# Patient Record
Sex: FEMALE | Race: WHITE | NOT HISPANIC OR LATINO | Employment: OTHER | URBAN - METROPOLITAN AREA
[De-identification: names, ages, dates, MRNs, and addresses within clinical notes are randomized per-mention and may not be internally consistent; named-entity substitution may affect disease eponyms.]

---

## 2018-06-20 ENCOUNTER — TRANSCRIBE ORDERS (OUTPATIENT)
Dept: ADMINISTRATIVE | Facility: HOSPITAL | Age: 73
End: 2018-06-20

## 2018-06-20 DIAGNOSIS — Z12.39 ENCOUNTER FOR BREAST CANCER SCREENING OTHER THAN MAMMOGRAM: ICD-10-CM

## 2018-06-20 DIAGNOSIS — Z12.39 SCREENING BREAST EXAMINATION: Primary | ICD-10-CM

## 2018-07-26 ENCOUNTER — TELEPHONE (OUTPATIENT)
Dept: FAMILY MEDICINE CLINIC | Facility: CLINIC | Age: 73
End: 2018-07-26

## 2018-07-27 DIAGNOSIS — E05.90 HYPERTHYROIDISM: Primary | ICD-10-CM

## 2018-07-28 PROBLEM — E78.00 HIGH CHOLESTEROL: Status: ACTIVE | Noted: 2018-07-28

## 2018-07-28 PROBLEM — I25.10 CAD (CORONARY ARTERY DISEASE): Status: ACTIVE | Noted: 2018-07-28

## 2018-07-28 PROBLEM — I10 HYPERTENSION: Status: ACTIVE | Noted: 2018-07-28

## 2018-07-28 PROBLEM — J44.9 COPD (CHRONIC OBSTRUCTIVE PULMONARY DISEASE) (HCC): Status: ACTIVE | Noted: 2018-07-28

## 2018-07-28 PROBLEM — M06.9 RHEUMATOID ARTHRITIS (HCC): Status: ACTIVE | Noted: 2018-07-28

## 2018-07-28 RX ORDER — ASPIRIN 81 MG/1
81 TABLET ORAL DAILY
COMMUNITY

## 2018-07-28 RX ORDER — SIMVASTATIN 80 MG
80 TABLET ORAL DAILY
COMMUNITY
End: 2019-02-01 | Stop reason: SDUPTHER

## 2018-07-28 RX ORDER — LISINOPRIL AND HYDROCHLOROTHIAZIDE 20; 12.5 MG/1; MG/1
1 TABLET ORAL DAILY
COMMUNITY
End: 2018-11-29 | Stop reason: SDUPTHER

## 2018-07-28 RX ORDER — METOPROLOL SUCCINATE 50 MG/1
25 TABLET, EXTENDED RELEASE ORAL DAILY
COMMUNITY
End: 2020-01-01 | Stop reason: SDUPTHER

## 2018-07-28 RX ORDER — TRAMADOL HYDROCHLORIDE 50 MG/1
50 TABLET ORAL DAILY
COMMUNITY
End: 2018-10-02 | Stop reason: SDUPTHER

## 2018-07-28 RX ORDER — LEVOTHYROXINE SODIUM 0.05 MG/1
50 TABLET ORAL DAILY
COMMUNITY
End: 2018-12-09 | Stop reason: SDUPTHER

## 2018-07-28 RX ORDER — METHOTREXATE 2.5 MG/1
2.5 TABLET ORAL
COMMUNITY

## 2018-08-01 ENCOUNTER — HOSPITAL ENCOUNTER (EMERGENCY)
Facility: HOSPITAL | Age: 73
Discharge: HOME | End: 2018-08-01
Attending: EMERGENCY MEDICINE | Admitting: EMERGENCY MEDICINE
Payer: OTHER MISCELLANEOUS

## 2018-08-01 ENCOUNTER — APPOINTMENT (EMERGENCY)
Dept: CT IMAGING | Facility: HOSPITAL | Age: 73
End: 2018-08-01
Payer: OTHER MISCELLANEOUS

## 2018-08-01 VITALS
SYSTOLIC BLOOD PRESSURE: 170 MMHG | BODY MASS INDEX: 27.47 KG/M2 | HEART RATE: 74 BPM | TEMPERATURE: 99.6 F | OXYGEN SATURATION: 99 % | RESPIRATION RATE: 17 BRPM | HEIGHT: 67 IN | WEIGHT: 175 LBS | DIASTOLIC BLOOD PRESSURE: 80 MMHG

## 2018-08-01 DIAGNOSIS — S29.011A MUSCLE STRAIN OF CHEST WALL, INITIAL ENCOUNTER: Primary | ICD-10-CM
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
## 2018-08-01 DIAGNOSIS — S20.219A CHEST WALL CONTUSION: ICD-10-CM

## 2018-08-01 DIAGNOSIS — M54.9 BACK PAIN: ICD-10-CM

## 2018-08-01 LAB
ANION GAP SERPL CALCULATED.3IONS-SCNC: 8 MMOL/L (ref 4–13)
BASOPHILS # BLD AUTO: 0 THOUSANDS/ΜL (ref 0–0.1)
BASOPHILS NFR BLD AUTO: 0 % (ref 0–1)
BUN SERPL-MCNC: 14 MG/DL (ref 7–25)
CALCIUM SERPL-MCNC: 10.2 MG/DL (ref 8.6–10.5)
CHLORIDE SERPL-SCNC: 104 MMOL/L (ref 98–107)
CO2 SERPL-SCNC: 26 MMOL/L (ref 21–31)
CREAT SERPL-MCNC: 0.9 MG/DL (ref 0.6–1.2)
EOSINOPHIL # BLD AUTO: 0 THOUSAND/ΜL (ref 0–0.61)
EOSINOPHIL NFR BLD AUTO: 1 % (ref 0–6)
ERYTHROCYTE [DISTWIDTH] IN BLOOD BY AUTOMATED COUNT: 14.7 % (ref 11.6–15.1)
GFR SERPL CREATININE-BSD FRML MDRD: 63 ML/MIN/1.73SQ M
GLUCOSE SERPL-MCNC: 107 MG/DL (ref 65–140)
HCT VFR BLD AUTO: 41.5 % (ref 37–47)
HGB BLD-MCNC: 14 G/DL (ref 11.5–15.4)
LYMPHOCYTES # BLD AUTO: 0.8 THOUSANDS/ΜL (ref 0.6–4.47)
LYMPHOCYTES NFR BLD AUTO: 11 % (ref 14–44)
MCH RBC QN AUTO: 33.6 PG (ref 26.8–34.3)
MCHC RBC AUTO-ENTMCNC: 33.6 G/DL (ref 31.4–37.4)
MCV RBC AUTO: 100 FL (ref 82–98)
MONOCYTES # BLD AUTO: 0.8 THOUSAND/ΜL (ref 0.17–1.22)
MONOCYTES NFR BLD AUTO: 10 % (ref 4–12)
NEUTROPHILS # BLD AUTO: 5.9 THOUSANDS/ΜL (ref 1.85–7.62)
NEUTS SEG NFR BLD AUTO: 78 % (ref 43–75)
NRBC BLD AUTO-RTO: 0 /100 WBCS
PLATELET # BLD AUTO: 316 THOUSANDS/UL (ref 149–390)
PMV BLD AUTO: 8.6 FL (ref 8.9–12.7)
POTASSIUM SERPL-SCNC: 3.8 MMOL/L (ref 3.5–5.5)
RBC # BLD AUTO: 4.16 MILLION/UL (ref 3.81–5.12)
SODIUM SERPL-SCNC: 138 MMOL/L (ref 134–143)
TROPONIN I SERPL-MCNC: <0.03 NG/ML
WBC # BLD AUTO: 7.6 THOUSAND/UL (ref 4.31–10.16)

## 2018-08-01 PROCEDURE — 80048 BASIC METABOLIC PNL TOTAL CA: CPT | Performed by: EMERGENCY MEDICINE

## 2018-08-01 PROCEDURE — 36415 COLL VENOUS BLD VENIPUNCTURE: CPT | Performed by: EMERGENCY MEDICINE

## 2018-08-01 PROCEDURE — 99284 EMERGENCY DEPT VISIT MOD MDM: CPT

## 2018-08-01 PROCEDURE — 85025 COMPLETE CBC W/AUTO DIFF WBC: CPT | Performed by: EMERGENCY MEDICINE

## 2018-08-01 PROCEDURE — 74176 CT ABD & PELVIS W/O CONTRAST: CPT

## 2018-08-01 PROCEDURE — 84484 ASSAY OF TROPONIN QUANT: CPT | Performed by: EMERGENCY MEDICINE

## 2018-08-01 PROCEDURE — 71250 CT THORAX DX C-: CPT

## 2018-08-01 PROCEDURE — 93005 ELECTROCARDIOGRAM TRACING: CPT

## 2018-08-01 RX ORDER — 0.9 % SODIUM CHLORIDE 0.9 %
3 VIAL (ML) INJECTION AS NEEDED
Status: DISCONTINUED | OUTPATIENT
Start: 2018-08-01 | End: 2018-08-01 | Stop reason: HOSPADM

## 2018-08-01 RX ORDER — DIAZEPAM 5 MG/1
5 TABLET ORAL ONCE
Status: COMPLETED | OUTPATIENT
Start: 2018-08-01 | End: 2018-08-01

## 2018-08-01 RX ORDER — DIAZEPAM 5 MG/1
5 TABLET ORAL 2 TIMES DAILY
Qty: 20 TABLET | Refills: 0 | Status: SHIPPED | OUTPATIENT
Start: 2018-08-01 | End: 2019-07-17

## 2018-08-01 RX ADMIN — DIAZEPAM 5 MG: 5 TABLET ORAL at 14:23

## 2018-08-01 NOTE — DISCHARGE INSTRUCTIONS
Acute Low Back Pain   WHAT YOU NEED TO KNOW:   Acute low back pain is sudden discomfort in your lower back area that lasts for up to 6 weeks  The discomfort makes it difficult to tolerate activity  DISCHARGE INSTRUCTIONS:   Return to the emergency department if:   · You have severe pain  · You have sudden stiffness and heaviness on both buttocks down to both legs  · You have numbness or weakness in one leg, or pain in both legs  · You have numbness in your genital area or across your lower back  · You cannot control your urine or bowel movements  Contact your healthcare provider if:   · You have a fever  · You have pain at night or when you rest     · Your pain does not get better with treatment  · You have pain that worsens when you cough or sneeze  · You suddenly feel something pop or snap in your back  · You have questions or concerns about your condition or care  Medicines: The following medicines may be ordered by your healthcare provider:  · Acetaminophen  decreases pain  It is available without a doctor's order  Ask how much to take and how often to take it  Follow directions  Acetaminophen can cause liver damage if not taken correctly  · NSAIDs  help decrease swelling and pain  This medicine is available with or without a doctor's order  NSAIDs can cause stomach bleeding or kidney problems in certain people  If you take blood thinner medicine, always ask your healthcare provider if NSAIDs are safe for you  Always read the medicine label and follow directions  · Prescription pain medicine  may be given  Ask your healthcare provider how to take this medicine safely  · Muscle relaxers  decrease pain by relaxing the muscles in your lower spine  · Take your medicine as directed  Contact your healthcare provider if you think your medicine is not helping or if you have side effects  Tell him of her if you are allergic to any medicine   Keep a list of the medicines, vitamins, and herbs you take  Include the amounts, and when and why you take them  Bring the list or the pill bottles to follow-up visits  Carry your medicine list with you in case of an emergency  Self-care:   · Stay active  as much as you can without causing more pain  Bed rest could make your back pain worse  Start with some light exercises such as walking  Avoid heavy lifting until your pain is gone  Ask for more information about the activities or exercises that are right for you  · Ice  helps decrease swelling, pain, and muscle spams  Put crushed ice in a plastic bag  Cover it with a towel  Place it on your lower back for 20 to 30 minutes every 2 hours  Do this for about 2 to 3 days after your pain starts, or as directed  · Heat  helps decrease pain and muscle spasms  Start to use heat after treatment with ice has stopped  Use a small towel dampened with warm water or a heating pad, or sit in a warm bath  Apply heat on the area for 20 to 30 minutes every 2 hours for as many days as directed  Alternate heat and ice  Prevent acute low back pain:   · Use proper body mechanics  ¨ Bend at the hips and knees when you  objects  Do not bend from the waist  Use your leg muscles as you lift the load  Do not use your back  Keep the object close to your chest as you lift it  Try not to twist or lift anything above your waist     ¨ Change your position often when you stand for long periods of time  Rest one foot on a small box or footrest, and then switch to the other foot often  ¨ Try not to sit for long periods of time  When you do, sit in a straight-backed chair with your feet flat on the floor  Never reach, pull, or push while you are sitting  · Do exercises that strengthen your back muscles  Warm up before you exercise  Ask your healthcare provider the best exercises for you  · Maintain a healthy weight  Ask your healthcare provider how much you should weigh   Ask him to help you create a weight loss plan if you are overweight  Follow up with your healthcare provider as directed:  Return for a follow-up visit if you still have pain after 1 to 3 weeks of treatment  You may need to visit an orthopedist if your back pain lasts more than 12 weeks  Write down your questions so you remember to ask them during your visits  © 2017 [employer] Information is for End User's use only and may not be sold, redistributed or otherwise used for commercial purposes  All illustrations and images included in CareNotes® are the copyrighted property of [employer]  or [employer]  The above information is an  only  It is not intended as medical advice for individual conditions or treatments  Talk to your doctor, nurse or pharmacist before following any medical regimen to see if it is safe and effective for you  Fall Prevention   WHAT YOU NEED TO KNOW:   Fall prevention includes ways to make your home and other areas safer  It also includes ways you can move more carefully to prevent a fall  Health conditions that cause changes in your blood pressure, vision, or muscle strength and coordination may increase your risk for falls  Medicines may also increase your risk for falls if they make you dizzy, weak, or sleepy  DISCHARGE INSTRUCTIONS:   Call 911 or have someone else call if:   · You have fallen and are unconscious  · You have fallen and cannot move part of your body  Contact your healthcare provider if:   · You have fallen and have pain or a headache  · You have questions or concerns about your condition or care  Fall prevention tips:   · Stand or sit up slowly  This may help you keep your balance and prevent falls  · Use assistive devices as directed  Your healthcare provider may suggest that you use a cane or walker to help you keep your balance   You may need to have grab bars put in your bathroom near the toilet or in the shower  · Wear shoes that fit well and have soles that   Wear shoes both inside and outside  Use slippers with good   Do not wear shoes with high heels  · Wear a personal alarm  This is a device that allows you to call 911 if you fall and need help  Ask your healthcare provider for more information  · Stay active  Exercise can help strengthen your muscles and improve your balance  Your healthcare provider may recommend water aerobics or walking  He or she may also recommend physical therapy to improve your coordination  Never start an exercise program without talking to your healthcare provider first      · Manage your medical conditions  Keep all appointments with your healthcare providers  Visit your eye doctor as directed  Home safety tips:   · Add items to prevent falls in the bathroom  Put nonslip strips on your bath or shower floor to prevent you from slipping  Use a bath mat if you do not have carpet in the bathroom  This will prevent you from falling when you step out of the bath or shower  Use a shower seat so you do not need to stand while you shower  Sit on the toilet or a chair in your bathroom to dry yourself and put on clothing  This will prevent you from losing your balance from drying or dressing yourself while you are standing  · Keep paths clear  Remove books, shoes, and other objects from walkways and stairs  Place cords for telephones and lamps out of the way so that you do not need to walk over them  Tape them down if you cannot move them  Remove small rugs  If you cannot remove a rug, secure it with double-sided tape  This will prevent you from tripping  · Install bright lights in your home  Use night lights to help light paths to the bathroom or kitchen  Always turn on the light before you start walking  · Keep items you use often on shelves within reach  Do not use a step stool to help you reach an item      · Paint or place reflective tape on the edges of your stairs  This will help you see the stairs better  Follow up with your healthcare provider as directed:  Write down your questions so you remember to ask them during your visits  © 2017 [employer]  Information is for End User's use only and may not be sold, redistributed or otherwise used for commercial purposes  All illustrations and images included in CareNotes® are the copyrighted property of [employer]  or [employer]  The above information is an  only  It is not intended as medical advice for individual conditions or treatments  Talk to your doctor, nurse or pharmacist before following any medical regimen to see if it is safe and effective for you  Chest Pain   WHAT YOU NEED TO KNOW:   Chest pain can be caused by a range of conditions, from not serious to life-threatening  Chest pain can be a symptom of a digestive problem, such as acid reflux or a stomach ulcer  An anxiety attack or a strong emotion, such as anger, can also cause chest pain  Infection, inflammation, or a fracture in the bones or cartilage in your chest can cause pain or discomfort  Sometimes chest pain or pressure is caused by poor blood flow to your heart (angina)  Chest pain may also be caused by life-threatening conditions such as a heart attack or blood clot in your lungs  DISCHARGE INSTRUCTIONS:   Call 911 if:   · You have any of the following signs of a heart attack:      ¨ Squeezing, pressure, or pain in your chest that lasts longer than 5 minutes or returns    ¨ Discomfort or pain in your back, neck, jaw, stomach, or arm     ¨ Trouble breathing    ¨ Nausea or vomiting    ¨ Lightheadedness or a sudden cold sweat, especially with chest pain or trouble breathing    Return to the emergency department if:   · You have chest discomfort that gets worse, even with medicine  · You cough or vomit blood  · Your bowel movements are black or bloody       · You cannot stop vomiting, or it hurts to swallow  Contact your healthcare provider if:   · You have questions or concerns about your condition or care  Medicines:   · Medicines  may be given to treat the cause of your chest pain  Examples include pain medicine, anxiety medicine, or medicines to increase blood flow to your heart  · Do not take certain medicines without asking your healthcare provider first   These include NSAIDs, herbal or vitamin supplements, or hormones (estrogen or progestin)  · Take your medicine as directed  Contact your healthcare provider if you think your medicine is not helping or if you have side effects  Tell him or her if you are allergic to any medicine  Keep a list of the medicines, vitamins, and herbs you take  Include the amounts, and when and why you take them  Bring the list or the pill bottles to follow-up visits  Carry your medicine list with you in case of an emergency  Follow up with your healthcare provider within 72 hours, or as directed: You may need to return for more tests to find the cause of your chest pain  You may be referred to a specialist, such as a cardiologist or gastroenterologist  Write down your questions so you remember to ask them during your visits  Healthy living tips: The following are general healthy guidelines  If your chest pain is caused by a heart problem, your healthcare provider will give you specific guidelines to follow  · Do not smoke  Nicotine and other chemicals in cigarettes and cigars can cause lung and heart damage  Ask your healthcare provider for information if you currently smoke and need help to quit  E-cigarettes or smokeless tobacco still contain nicotine  Talk to your healthcare provider before you use these products  · Eat a variety of healthy, low-fat foods  Healthy foods include fruits, vegetables, whole-grain breads, low-fat dairy products, beans, lean meats, and fish   Ask for more information about a heart healthy diet     · Ask about activity  Your healthcare provider will tell you which activities to limit or avoid  Ask when you can drive, return to work, and have sex  Ask about the best exercise plan for you  · Maintain a healthy weight  Ask your healthcare provider how much you should weigh  Ask him or her to help you create a weight loss plan if you are overweight  · Get the flu and pneumonia vaccines  All adults should get the influenza (flu) vaccine  Get it every year as soon as it becomes available  The pneumococcal vaccine is given to adults aged 72 years or older  The vaccine is given every 5 years to prevent pneumococcal disease, such as pneumonia  © 2017 [address] [employer] Information is for End User's use only and may not be sold, redistributed or otherwise used for commercial purposes  All illustrations and images included in CareNotes® are the copyrighted property of [employer]  or [employer]  The above information is an  only  It is not intended as medical advice for individual conditions or treatments  Talk to your doctor, nurse or pharmacist before following any medical regimen to see if it is safe and effective for you  Costochondritis   WHAT YOU NEED TO KNOW:   Costochondritis is a condition that causes pain in the cartilage that connect your ribs to your sternum (breastbone)  Cartilage is the tough, bendable tissue that protects your bones  DISCHARGE INSTRUCTIONS:   Medicines:   · Acetaminophen: This medicine decreases pain  Acetaminophen is available without a doctor's order  Ask how much to take and how often to take it  Follow directions  Acetaminophen can cause liver damage if not taken correctly  · NSAIDs , such as ibuprofen, help decrease swelling, pain, and fever  This medicine is available with or without a doctor's order  NSAIDs can cause stomach bleeding or kidney problems in certain people   If you take blood thinner medicine, always ask if NSAIDs are safe for you  Always read the medicine label and follow directions  Do not give these medicines to children under 10months of age without direction from your child's healthcare provider  · Take your medicine as directed  Contact your healthcare provider if you think your medicine is not helping or if you have side effects  Tell him of her if you are allergic to any medicine  Keep a list of the medicines, vitamins, and herbs you take  Include the amounts, and when and why you take them  Bring the list or the pill bottles to follow-up visits  Carry your medicine list with you in case of an emergency  Follow up with your healthcare provider as directed:  Write down your questions so you remember to ask them during your visits  Rest:  You may need to get more rest  Learn which movements and activities cause pain, and avoid doing them  Do not carry objects, such as a purse or backpack, if this is painful  Avoid activities such as rowing and weightlifting until your pain decreases or goes away  Ask which activities are best for you to do while you recover  Heat:  Heat helps decrease pain in some patients  Apply heat on the area for 20 to 30 minutes every 2 hours for as many days as directed  Ice:  Ice helps decrease swelling and pain  Ice may also help prevent tissue damage  Use an ice pack, or put crushed ice in a plastic bag  Cover it with a towel and place it on the painful area for 15 to 20 minutes every hour or as directed  Stretching exercises:  Gentle stretching may help your symptoms   a doorway and put your hands on the door frame at the level of your ears or shoulders  Take 1 step forward and gently stretch your chest  Try this with your hands higher up on the doorway  Contact your healthcare provider if:   · You have a fever  · The painful areas of your chest look swollen, red, and feel warm to the touch  · You cannot sleep because of the pain      · You have questions or concerns about your condition or care  © 2017 [address] [facility] Information is for End User's use only and may not be sold, redistributed or otherwise used for commercial purposes  All illustrations and images included in CareNotes® are the copyrighted property of [employer]  or [employer]  The above information is an  only  It is not intended as medical advice for individual conditions or treatments  Talk to your doctor, nurse or pharmacist before following any medical regimen to see if it is safe and effective for you

## 2018-08-01 NOTE — ED PROVIDER NOTES
History  Chief Complaint   Patient presents with    Back Pain     she fell last week and hyperextended neck backwards to protect her face  pain when she lifts her arms, looks up or twist certain ways  Tried motrin yesterday no relief     Patient reports to the emergency department with complaint of chest pain and back pain after a fall 1 week ago  Patient states she fell forward onto her chest and abdomen and both forearms 1 week out at work  Patient had some low back pain at that time  Two days after the fall patient developed sternal chest pain that patient felt was musculoskeletal and for which she took some ibuprofen and Tylenol with no relief  Patient's chest pain has now developed to envelope around the entire chest both sides  Patient's pain is worse with coughing and deep inspiration or lifting her arms out  Patient has not seen her primary care physician, Dr Lorenza Lara, for this problem yet  History provided by:  Patient      Prior to Admission Medications   Prescriptions Last Dose Informant Patient Reported?  Taking?   aspirin (ECOTRIN LOW STRENGTH) 81 mg EC tablet 8/1/2018 at Unknown time  Yes Yes   Sig: Take 81 mg by mouth daily   levothyroxine 50 mcg tablet 8/1/2018 at Unknown time  Yes Yes   Sig: Take 50 mcg by mouth daily   lisinopril-hydrochlorothiazide (PRINZIDE,ZESTORETIC) 20-12 5 MG per tablet 8/1/2018 at Unknown time  Yes Yes   Sig: Take 1 tablet by mouth daily   methotrexate 2 5 MG tablet   Yes No   Sig: Take 2 5 mg by mouth As directed   metoprolol succinate (TOPROL-XL) 50 mg 24 hr tablet 8/1/2018 at Unknown time  Yes Yes   Sig: Take 25 mg by mouth daily   simvastatin (ZOCOR) 80 mg tablet 7/31/2018 at Unknown time  Yes Yes   Sig: Take 80 mg by mouth daily   traMADol (ULTRAM) 50 mg tablet   Yes No   Sig: Take 50 mg by mouth daily      Facility-Administered Medications: None       Past Medical History:   Diagnosis Date    Atherosclerosis of CABG, unsp, w oth angina pectoris ([address] )     Cellulitis     Essential hypertension     Hypothyroidism     [facility] [address] [facility]     Rheumatoid arthritis [facility] [address] [facility]        Past Surgical History:   Procedure Laterality Date    CYSTOSCOPY      HYSTERECTOMY      total    OTHER SURGICAL HISTORY      heart stent    TONSILLECTOMY         History reviewed  No pertinent family history  I have reviewed and agree with the history as documented  Social History   Substance Use Topics    Smoking status: Heavy Tobacco Smoker    Smokeless tobacco: Never Used      Comment: compulsive smoker-wont quit    Alcohol use No        Review of Systems   Constitutional: Negative for chills and fever  HENT: Negative for rhinorrhea and sore throat  Eyes: Negative for visual disturbance  Respiratory: Negative for cough and shortness of breath  Cardiovascular: Positive for chest pain  Negative for leg swelling  Chest pain and back pain are worse with cough deep inspiration and movement  Gastrointestinal: Negative for abdominal pain, diarrhea, nausea and vomiting  Genitourinary: Negative for dysuria  Musculoskeletal: Positive for back pain  Negative for myalgias  Chest pain and back pain are worse with cough deep inspiration and movement  Skin: Negative for rash  Neurological: Negative for dizziness and headaches  Psychiatric/Behavioral: Negative for confusion  All other systems reviewed and are negative  Physical Exam  Physical Exam   Constitutional: She is oriented to person, place, and time  She appears well-developed and well-nourished  HENT:   Nose: Nose normal    Mouth/Throat: Oropharynx is clear and moist  No oropharyngeal exudate  Eyes: Conjunctivae and EOM are normal  Pupils are equal, round, and reactive to light  No scleral icterus  Neck: Normal range of motion  Neck supple  No JVD present  No tracheal deviation present  Cardiovascular: Normal rate, regular rhythm and normal heart sounds      No murmur heard   Pulmonary/Chest: Effort normal and breath sounds normal  No respiratory distress  She has no wheezes  She has no rales  She exhibits tenderness  Although patient does have a normal respiratory effort, patient has chest pain with deep inspiration and coughing  There is tenderness with palpation to the chest wall admit chest there is also tenderness to the mid and low back with palpation and range of motion  Abdominal: Soft  Bowel sounds are normal  There is no tenderness  There is no guarding  Musculoskeletal: Normal range of motion  She exhibits no edema or tenderness  Neurological: She is alert and oriented to person, place, and time  No cranial nerve deficit or sensory deficit  She exhibits normal muscle tone  5/5 motor, nl sens   Skin: Skin is warm and dry  Psychiatric: She has a normal mood and affect  Her behavior is normal    Nursing note and vitals reviewed        Vital Signs  ED Triage Vitals [08/01/18 1233]   Temperature Pulse Respirations Blood Pressure SpO2   99 6 °F (37 6 °C) 84 18 170/92 94 %      Temp Source Heart Rate Source Patient Position - Orthostatic VS BP Location FiO2 (%)   Tympanic Monitor Sitting Left arm --      Pain Score       7           Vitals:    08/01/18 1233 08/01/18 1431   BP: 170/92 170/80   Pulse: 84 74   Patient Position - Orthostatic VS: Sitting Sitting       Visual Acuity      ED Medications  Medications   sodium chloride (PF) 0 9 % injection 3 mL (not administered)   diazepam (VALIUM) tablet 5 mg (5 mg Oral Given 8/1/18 1423)       Diagnostic Studies  Results Reviewed     Procedure Component Value Units Date/Time    Basic metabolic panel [[id number]] Collected:  08/01/18 1249    Lab Status:  Final result Specimen:  Blood from Arm, Right Updated:  08/01/18 1339     Sodium 138 mmol/L      Potassium 3 8 mmol/L      Chloride 104 mmol/L      CO2 26 mmol/L      Anion Gap 8 mmol/L      BUN 14 mg/dL      Creatinine 0 90 mg/dL      Glucose 107 mg/dL      Calcium 10 2 mg/dL      eGFR 63 ml/min/1 73sq m     Narrative:         National Kidney Disease Education Program recommendations are as follows:  GFR calculation is accurate only with a steady state creatinine  Chronic Kidney disease less than 60 ml/min/1 73 sq  meters  Kidney failure less than 15 ml/min/1 73 sq  meters  Troponin I [[id number]]  (Normal) Collected:  08/01/18 1249    Lab Status:  Final result Specimen:  Blood from Arm, Right Updated:  08/01/18 1339     Troponin I <0 03 ng/mL     CBC and differential [[id number]]  (Abnormal) Collected:  08/01/18 1249    Lab Status:  Final result Specimen:  Blood from Arm, Right Updated:  08/01/18 1311     WBC 7 60 Thousand/uL      RBC 4 16 Million/uL      Hemoglobin 14 0 g/dL      Hematocrit 41 5 %       (H) fL      MCH 33 6 pg      MCHC 33 6 g/dL      RDW 14 7 %      MPV 8 6 (L) fL      Platelets 432 Thousands/uL      nRBC 0 /100 WBCs      Neutrophils Relative 78 (H) %      Lymphocytes Relative 11 (L) %      Monocytes Relative 10 %      Eosinophils Relative 1 %      Basophils Relative 0 %      Neutrophils Absolute 5 90 Thousands/µL      Lymphocytes Absolute 0 80 Thousands/µL      Monocytes Absolute 0 80 Thousand/µL      Eosinophils Absolute 0 00 Thousand/µL      Basophils Absolute 0 00 Thousands/µL     Chadron draw [[id number]] Collected:  08/01/18 1251    Lab Status:  Final result Specimen:  Blood from Arm, Right Updated:  08/01/18 1308    Narrative: The following orders were created for panel order [place] draw  Procedure                               Abnormality         Status                     ---------                               -----------         ------                     Elfredia Canter Top on [employer][[id number]]                            Final result               Gold top on [employer][[id number]]                                  Final result                 Please view results for these tests on the individual orders                   CT chest abdomen pelvis wo contrast Final Result by Magui Espinal (08/01 1355)   No evidence of acute traumatic injury  Limited without contrast          Signed by YELITZA Cruz  Procedures  Procedures       Phone Contacts  ED Phone Contact    ED Course  ED Course as of Aug 01 1540   Wed Aug 01, 2018   1250 Normal sinus rhythm, rate 70 beats per minute, normal axis, interventricular conduction defect present, no STEMI seen  ECG 12 lead   1410 Results reviewed with patient  Diagnosis plan of treatment and follow-up discussed  Use of Valium and its risks discussed with patient and she is aware not to drive or operate heavy machinery or perform any other at risk activities for at least 8 hours after taking Valium  [facility]  CritCare Time    Disposition  Final diagnoses:   Muscle strain of chest wall, initial encounter   Chest wall contusion   Back pain   Fall, initial encounter     Time reflects when diagnosis was documented in both MDM as applicable and the Disposition within this note     Time User Action Codes Description Comment    8/1/2018  2:12 PM Lambert Darrick Add [X12 916H] Muscle strain of chest wall, initial encounter     8/1/2018  2:13 PM Lambert Darrick Add [S20 219A] Chest wall contusion     8/1/2018  2:13 PM Lambert Darrick Add [M54 9] Back pain     8/1/2018  2:13 PM Lambert Darrick Add Renu Soler, initial encounter       ED Disposition     ED Disposition Condition Comment    Discharge  Luis Ly discharge to home/self care      Condition at discharge: Stable        Follow-up Information     Follow up With Specialties Details Why [address], MD Internal Medicine In 3 days re-evaluation [id number]  [phone]            Discharge Medication List as of 8/1/2018  2:15 PM      START taking these medications    Details   diazepam (VALIUM) 5 mg tablet Take 1 tablet (5 mg total) by mouth 2 (two) times a day for 10 days, Starting Wed 8/1/2018, Until Sat 8/11/2018, Print         CONTINUE these medications which have NOT CHANGED    Details   aspirin (ECOTRIN LOW STRENGTH) 81 mg EC tablet Take 81 mg by mouth daily, Historical Med      levothyroxine 50 mcg tablet Take 50 mcg by mouth daily, Historical Med      lisinopril-hydrochlorothiazide (PRINZIDE,ZESTORETIC) 20-12 5 MG per tablet Take 1 tablet by mouth daily, Historical Med      metoprolol succinate (TOPROL-XL) 50 mg 24 hr tablet Take 25 mg by mouth daily, Historical Med      simvastatin (ZOCOR) 80 mg tablet Take 80 mg by mouth daily, Historical Med      methotrexate 2 5 MG tablet Take 2 5 mg by mouth As directed, Historical Med      traMADol (ULTRAM) 50 mg tablet Take 50 mg by mouth daily, Historical Med           No discharge procedures on file      ED Provider  Electronically Signed by           Sharif Kat DO  08/01/18 Lenore Reyes DO  08/01/18 4313

## 2018-08-02 ENCOUNTER — TELEPHONE (OUTPATIENT)
Dept: FAMILY MEDICINE CLINIC | Facility: CLINIC | Age: 73
End: 2018-08-02

## 2018-08-02 LAB
ATRIAL RATE: 70 BPM
P AXIS: 46 DEGREES
PR INTERVAL: 156 MS
QRS AXIS: 2 DEGREES
QRSD INTERVAL: 106 MS
QT INTERVAL: 424 MS
QTC INTERVAL: 457 MS
T WAVE AXIS: 9 DEGREES
VENTRICULAR RATE: 70 BPM

## 2018-08-02 NOTE — TELEPHONE ENCOUNTER
Patient called into the office stating that she has fallen a few days ago  Patient was seen in the emergency room and was given Valium  Patient states this not working  Her daughter takes Angélica Graff and she is wondering if she is able to try this out  Please advise   Patient callback number is [phone]

## 2018-08-03 DIAGNOSIS — R52 PAIN: Primary | ICD-10-CM

## 2018-08-03 RX ORDER — CARISOPRODOL 250 MG/1
250 TABLET ORAL 3 TIMES DAILY
Qty: 21 TABLET | Refills: 0 | Status: SHIPPED | OUTPATIENT
Start: 2018-08-03 | End: 2019-07-17

## 2018-08-03 RX ORDER — CARISOPRODOL 250 MG/1
250 TABLET ORAL 4 TIMES DAILY
Qty: 21 TABLET | Refills: 0 | Status: CANCELLED | OUTPATIENT
Start: 2018-08-03

## 2018-08-13 DIAGNOSIS — G47.00 INSOMNIA, UNSPECIFIED TYPE: Primary | ICD-10-CM

## 2018-08-13 RX ORDER — ZOLPIDEM TARTRATE 10 MG/1
TABLET ORAL
Qty: 30 TABLET | Refills: 0 | Status: SHIPPED | OUTPATIENT
Start: 2018-08-13 | End: 2018-09-10 | Stop reason: SDUPTHER

## 2018-08-29 ENCOUNTER — TRANSCRIBE ORDERS (OUTPATIENT)
Dept: ADMINISTRATIVE | Facility: HOSPITAL | Age: 73
End: 2018-08-29

## 2018-08-29 DIAGNOSIS — I25.10 CORONARY ARTERY DISEASE INVOLVING NATIVE CORONARY ARTERY WITHOUT ANGINA PECTORIS, UNSPECIFIED WHETHER NATIVE OR TRANSPLANTED HEART: Primary | ICD-10-CM

## 2018-08-29 DIAGNOSIS — R53.0 NEOPLASTIC MALIGNANT RELATED FATIGUE: ICD-10-CM

## 2018-08-29 DIAGNOSIS — R35.0 URINARY FREQUENCY: Primary | ICD-10-CM

## 2018-08-29 RX ORDER — NITROGLYCERIN 0.3 MG/1
0.3 TABLET SUBLINGUAL
Qty: 90 TABLET | Refills: 0 | Status: SHIPPED | OUTPATIENT
Start: 2018-08-29 | End: 2018-10-03 | Stop reason: SDUPTHER

## 2018-09-10 DIAGNOSIS — G47.00 INSOMNIA, UNSPECIFIED TYPE: ICD-10-CM

## 2018-09-11 RX ORDER — ZOLPIDEM TARTRATE 10 MG/1
10 TABLET ORAL
Qty: 30 TABLET | Refills: 0 | Status: SHIPPED | OUTPATIENT
Start: 2018-09-11 | End: 2018-10-11 | Stop reason: SDUPTHER

## 2018-10-02 DIAGNOSIS — R52 PAIN: Primary | ICD-10-CM

## 2018-10-03 DIAGNOSIS — I25.10 CORONARY ARTERY DISEASE INVOLVING NATIVE CORONARY ARTERY WITHOUT ANGINA PECTORIS, UNSPECIFIED WHETHER NATIVE OR TRANSPLANTED HEART: ICD-10-CM

## 2018-10-03 RX ORDER — NITROGLYCERIN 0.4 MG/1
0.4 TABLET SUBLINGUAL
Qty: 30 TABLET | Refills: 3 | Status: SHIPPED | OUTPATIENT
Start: 2018-10-03

## 2018-10-03 RX ORDER — NITROGLYCERIN 0.4 MG/1
0.3 TABLET SUBLINGUAL
Qty: 30 TABLET | Refills: 1 | Status: CANCELLED | OUTPATIENT
Start: 2018-10-03

## 2018-10-03 RX ORDER — TRAMADOL HYDROCHLORIDE 50 MG/1
TABLET ORAL
Qty: 30 TABLET | Refills: 0 | Status: SHIPPED | OUTPATIENT
Start: 2018-10-03 | End: 2018-11-19 | Stop reason: SDUPTHER

## 2018-10-03 NOTE — TELEPHONE ENCOUNTER
Pharmacy called in regards to patient  States patient is requesting a script for Nitro 0 4 instead of the 0 3 due to quantity specifications  Is this alright to change?

## 2018-10-11 DIAGNOSIS — G47.00 INSOMNIA, UNSPECIFIED TYPE: ICD-10-CM

## 2018-10-12 ENCOUNTER — HOSPITAL ENCOUNTER (OUTPATIENT)
Dept: ULTRASOUND IMAGING | Facility: HOSPITAL | Age: 73
Discharge: HOME | End: 2018-10-12
Payer: MEDICARE

## 2018-10-12 DIAGNOSIS — R35.0 URINARY FREQUENCY: ICD-10-CM

## 2018-10-12 PROCEDURE — 76770 US EXAM ABDO BACK WALL COMP: CPT

## 2018-10-12 RX ORDER — ZOLPIDEM TARTRATE 10 MG/1
TABLET ORAL
Qty: 30 TABLET | Refills: 0 | Status: SHIPPED | OUTPATIENT
Start: 2018-10-12 | End: 2018-11-19 | Stop reason: SDUPTHER

## 2018-11-08 ENCOUNTER — IMMUNIZATION (OUTPATIENT)
Dept: FAMILY MEDICINE CLINIC | Facility: CLINIC | Age: 73
End: 2018-11-08
Payer: MEDICARE

## 2018-11-08 DIAGNOSIS — R52 PAIN: ICD-10-CM

## 2018-11-08 DIAGNOSIS — G47.00 INSOMNIA, UNSPECIFIED TYPE: ICD-10-CM

## 2018-11-08 DIAGNOSIS — Z23 NEEDS FLU SHOT: Primary | ICD-10-CM

## 2018-11-08 PROCEDURE — 90662 IIV NO PRSV INCREASED AG IM: CPT

## 2018-11-08 PROCEDURE — G0008 ADMIN INFLUENZA VIRUS VAC: HCPCS

## 2018-11-09 RX ORDER — ZOLPIDEM TARTRATE 10 MG/1
TABLET ORAL
Qty: 30 TABLET | Refills: 0 | OUTPATIENT
Start: 2018-11-09

## 2018-11-09 RX ORDER — TRAMADOL HYDROCHLORIDE 50 MG/1
TABLET ORAL
Qty: 30 TABLET | Refills: 0 | OUTPATIENT
Start: 2018-11-09

## 2018-11-09 NOTE — TELEPHONE ENCOUNTER
I have been inappropriately named as PCP for this patient  I have never seen this patient as their PCP

## 2018-11-12 DIAGNOSIS — R52 PAIN: ICD-10-CM

## 2018-11-12 DIAGNOSIS — G47.00 INSOMNIA, UNSPECIFIED TYPE: ICD-10-CM

## 2018-11-12 RX ORDER — ZOLPIDEM TARTRATE 10 MG/1
10 TABLET ORAL
Qty: 30 TABLET | Refills: 0 | Status: CANCELLED | OUTPATIENT
Start: 2018-11-12

## 2018-11-12 RX ORDER — TRAMADOL HYDROCHLORIDE 50 MG/1
50 TABLET ORAL DAILY
Qty: 30 TABLET | Refills: 0 | Status: CANCELLED | OUTPATIENT
Start: 2018-11-12

## 2018-11-19 DIAGNOSIS — G47.00 INSOMNIA, UNSPECIFIED TYPE: ICD-10-CM

## 2018-11-19 DIAGNOSIS — R52 PAIN: ICD-10-CM

## 2018-11-19 RX ORDER — ZOLPIDEM TARTRATE 10 MG/1
10 TABLET ORAL
Qty: 30 TABLET | Refills: 0 | Status: SHIPPED | OUTPATIENT
Start: 2018-11-19 | End: 2018-12-20 | Stop reason: SDUPTHER

## 2018-11-19 RX ORDER — TRAMADOL HYDROCHLORIDE 50 MG/1
50 TABLET ORAL DAILY
Qty: 30 TABLET | Refills: 0 | Status: SHIPPED | OUTPATIENT
Start: 2018-11-19 | End: 2019-01-15 | Stop reason: SDUPTHER

## 2018-11-29 DIAGNOSIS — I10 ESSENTIAL HYPERTENSION: Primary | ICD-10-CM

## 2018-12-03 RX ORDER — LISINOPRIL AND HYDROCHLOROTHIAZIDE 20; 12.5 MG/1; MG/1
TABLET ORAL
Qty: 90 TABLET | Refills: 2 | Status: SHIPPED | OUTPATIENT
Start: 2018-12-03 | End: 2019-09-09 | Stop reason: SDUPTHER

## 2018-12-07 ENCOUNTER — TRANSCRIBE ORDERS (OUTPATIENT)
Dept: ADMINISTRATIVE | Facility: HOSPITAL | Age: 73
End: 2018-12-07

## 2018-12-07 DIAGNOSIS — N20.0 URIC ACID NEPHROLITHIASIS: Primary | ICD-10-CM

## 2018-12-09 DIAGNOSIS — E03.9 HYPOTHYROIDISM, UNSPECIFIED TYPE: Primary | ICD-10-CM

## 2018-12-10 RX ORDER — LEVOTHYROXINE SODIUM 0.05 MG/1
TABLET ORAL
Qty: 90 TABLET | Refills: 2 | Status: SHIPPED | OUTPATIENT
Start: 2018-12-10 | End: 2019-09-09 | Stop reason: SDUPTHER

## 2018-12-14 ENCOUNTER — HOSPITAL ENCOUNTER (OUTPATIENT)
Dept: CT IMAGING | Facility: HOSPITAL | Age: 73
Discharge: HOME | End: 2018-12-14
Payer: MEDICARE

## 2018-12-14 DIAGNOSIS — N20.0 URIC ACID NEPHROLITHIASIS: ICD-10-CM

## 2018-12-14 PROCEDURE — 74176 CT ABD & PELVIS W/O CONTRAST: CPT

## 2018-12-20 DIAGNOSIS — G47.00 INSOMNIA, UNSPECIFIED TYPE: ICD-10-CM

## 2018-12-20 RX ORDER — ZOLPIDEM TARTRATE 10 MG/1
10 TABLET ORAL
Qty: 30 TABLET | Refills: 0 | Status: SHIPPED | OUTPATIENT
Start: 2018-12-20 | End: 2019-01-21 | Stop reason: SDUPTHER

## 2019-01-15 DIAGNOSIS — R52 PAIN: ICD-10-CM

## 2019-01-15 RX ORDER — TRAMADOL HYDROCHLORIDE 50 MG/1
50 TABLET ORAL DAILY
Qty: 30 TABLET | Refills: 0 | Status: SHIPPED | OUTPATIENT
Start: 2019-01-15 | End: 2019-03-11 | Stop reason: SDUPTHER

## 2019-01-16 RX ORDER — TRAMADOL HYDROCHLORIDE 50 MG/1
TABLET ORAL
Qty: 30 TABLET | Refills: 0 | OUTPATIENT
Start: 2019-01-16

## 2019-01-21 DIAGNOSIS — G47.00 INSOMNIA, UNSPECIFIED TYPE: ICD-10-CM

## 2019-01-21 RX ORDER — ZOLPIDEM TARTRATE 10 MG/1
10 TABLET ORAL
Qty: 30 TABLET | Refills: 0 | Status: SHIPPED | OUTPATIENT
Start: 2019-01-21 | End: 2019-02-22 | Stop reason: SDUPTHER

## 2019-02-01 DIAGNOSIS — E78.00 HYPERCHOLESTEROLEMIA: Primary | ICD-10-CM

## 2019-02-04 RX ORDER — SIMVASTATIN 80 MG
TABLET ORAL
Qty: 90 TABLET | Refills: 2 | Status: SHIPPED | OUTPATIENT
Start: 2019-02-04 | End: 2019-10-30 | Stop reason: SDUPTHER

## 2019-02-22 DIAGNOSIS — G47.00 INSOMNIA, UNSPECIFIED TYPE: ICD-10-CM

## 2019-02-22 RX ORDER — ZOLPIDEM TARTRATE 10 MG/1
10 TABLET ORAL
Qty: 30 TABLET | Refills: 3 | Status: SHIPPED | OUTPATIENT
Start: 2019-02-22 | End: 2019-07-08 | Stop reason: SDUPTHER

## 2019-03-11 DIAGNOSIS — R52 PAIN: ICD-10-CM

## 2019-03-11 RX ORDER — TRAMADOL HYDROCHLORIDE 50 MG/1
50 TABLET ORAL DAILY
Qty: 30 TABLET | Refills: 0 | Status: SHIPPED | OUTPATIENT
Start: 2019-03-11 | End: 2019-06-10 | Stop reason: SDUPTHER

## 2019-03-11 RX ORDER — TRAMADOL HYDROCHLORIDE 50 MG/1
TABLET ORAL
Qty: 30 TABLET | Refills: 0 | Status: SHIPPED | OUTPATIENT
Start: 2019-03-11 | End: 2019-03-11 | Stop reason: SDUPTHER

## 2019-04-12 DIAGNOSIS — I25.10 CORONARY ARTERY DISEASE INVOLVING NATIVE CORONARY ARTERY OF NATIVE HEART WITHOUT ANGINA PECTORIS: Primary | ICD-10-CM

## 2019-04-15 RX ORDER — METOPROLOL SUCCINATE 50 MG/1
TABLET, EXTENDED RELEASE ORAL
Qty: 90 TABLET | Refills: 2 | Status: SHIPPED | OUTPATIENT
Start: 2019-04-15 | End: 2020-03-25 | Stop reason: SDUPTHER

## 2019-06-10 DIAGNOSIS — R52 PAIN: ICD-10-CM

## 2019-06-10 RX ORDER — TRAMADOL HYDROCHLORIDE 50 MG/1
TABLET ORAL
Qty: 30 TABLET | Refills: 0 | Status: SHIPPED | OUTPATIENT
Start: 2019-06-10 | End: 2019-08-20 | Stop reason: SDUPTHER

## 2019-07-08 DIAGNOSIS — G47.00 INSOMNIA, UNSPECIFIED TYPE: ICD-10-CM

## 2019-07-08 RX ORDER — ZOLPIDEM TARTRATE 10 MG/1
TABLET ORAL
Qty: 30 TABLET | Refills: 0 | Status: SHIPPED | OUTPATIENT
Start: 2019-07-08 | End: 2019-07-24 | Stop reason: SDUPTHER

## 2019-07-08 NOTE — TELEPHONE ENCOUNTER
Patient scheduled an appointment and is trequesting enough medication to hold her over as its been over a year since her last visit

## 2019-07-17 ENCOUNTER — OFFICE VISIT (OUTPATIENT)
Dept: FAMILY MEDICINE CLINIC | Facility: CLINIC | Age: 74
End: 2019-07-17
Payer: MEDICARE

## 2019-07-17 VITALS
TEMPERATURE: 99 F | BODY MASS INDEX: 27 KG/M2 | DIASTOLIC BLOOD PRESSURE: 72 MMHG | RESPIRATION RATE: 18 BRPM | SYSTOLIC BLOOD PRESSURE: 124 MMHG | HEART RATE: 69 BPM | WEIGHT: 172 LBS | OXYGEN SATURATION: 98 % | HEIGHT: 67 IN

## 2019-07-17 DIAGNOSIS — H61.22 IMPACTED CERUMEN OF LEFT EAR: ICD-10-CM

## 2019-07-17 DIAGNOSIS — E78.00 HIGH CHOLESTEROL: ICD-10-CM

## 2019-07-17 DIAGNOSIS — M06.9 RHEUMATOID ARTHRITIS, INVOLVING UNSPECIFIED SITE, UNSPECIFIED RHEUMATOID FACTOR PRESENCE: ICD-10-CM

## 2019-07-17 DIAGNOSIS — I10 ESSENTIAL HYPERTENSION: ICD-10-CM

## 2019-07-17 DIAGNOSIS — Z00.00 MEDICARE ANNUAL WELLNESS VISIT, SUBSEQUENT: Primary | ICD-10-CM

## 2019-07-17 DIAGNOSIS — E66.3 OVERWEIGHT (BMI 25.0-29.9): ICD-10-CM

## 2019-07-17 PROCEDURE — 99213 OFFICE O/P EST LOW 20 MIN: CPT | Performed by: NURSE PRACTITIONER

## 2019-07-17 PROCEDURE — G0439 PPPS, SUBSEQ VISIT: HCPCS | Performed by: NURSE PRACTITIONER

## 2019-07-17 NOTE — PROGRESS NOTES
Assessment and Plan:     Problem List Items Addressed This Visit        Cardiovascular and Mediastinum    Hypertension    Relevant Orders    Comprehensive metabolic panel       Musculoskeletal and Integument    Rheumatoid arthritis ([facility] [address] )       Other    High cholesterol    Relevant Orders    Lipid panel      Other Visit Diagnoses     Medicare annual wellness visit, subsequent    -  Primary    Impacted cerumen of left ear        Overweight (BMI 25 0-29  9)            BMI Counseling: Body mass index is 26 94 kg/m²  Discussed the patient's BMI with her  The BMI is above average  BMI counseling and education was provided to the patient  Nutrition recommendations include reducing portion sizes, 3-5 servings of fruits/vegetables daily, increasing intake of lean protein, reducing intake of saturated fat and trans fat and reducing intake of cholesterol  History of Present Illness:     Patient presents for Medicare Annual Wellness visit    Patient Care Team:  Danni mcdowell PCP - General (Family Medicine)     Problem List:     Patient Active Problem List   Diagnosis    CAD (coronary artery disease)    COPD (chronic obstructive pulmonary disease) ([facility] [address] [facility]    Hypertension    High cholesterol    [facility]      Past Medical and Surgical History:     Past Medical History:   Diagnosis Date    Atherosclerosis of CABG, unsp, w oth angina pectoris ([facility] [address] [facility]     Cellulitis     Essential hypertension     Hypothyroidism     Myocardial infarction ([facility] [address] [facility]     Rhe[facility]oid arthritis ([facility]      Past Surgical History:   Procedure Laterality Date    CYSTOSCOPY      HYSTERECTOMY      total    OTHER SURGICAL HISTORY      heart stent    TONSILLECTOMY        Family History:     History reviewed  No pertinent family history     Social History:     Social History     Tobacco Use   Smoking Status Heavy Tobacco Smoker   Smokeless Tobacco Never Used   Tobacco Comment    compulsive smoker-wont quit     Social History     Substance and Sexual Activity   Alcohol Use No     Social History     Substance and Sexual Activity   Drug Use No      Medications and Allergies:     Current Outpatient Medications   Medication Sig Dispense Refill    aspirin (ECOTRIN LOW STRENGTH) 81 mg EC tablet Take 81 mg by mouth daily      levothyroxine 50 mcg tablet TAKE ONE TABLET BY MOUTH ONCE DAILY 90 tablet 2    lisinopril-hydrochlorothiazide (PRINZIDE,ZESTORETIC) 20-12 5 MG per tablet TAKE ONE TABLET BY MOUTH ONCE DAILY 90 tablet 2    methotrexate 2 5 MG tablet Take 2 5 mg by mouth As directed      metoprolol succinate (TOPROL-XL) 50 mg 24 hr tablet TAKE ONE TABLET BY MOUTH ONCE DAILY 90 tablet 2    nitroglycerin (NITROSTAT) 0 4 mg SL tablet Place 1 tablet (0 4 mg total) under the tongue every 5 (five) minutes as needed for chest pain 30 tablet 3    simvastatin (ZOCOR) 80 mg tablet TAKE ONE TABLET BY MOUTH ONCE DAILY 90 tablet 2    traMADol (ULTRAM) 50 mg tablet TAKE ONE TABLET BY MOUTH ONCE DAILY 30 tablet 0    zolpidem (AMBIEN) 10 mg tablet TAKE ONE TABLET BY MOUTH AT BEDTIME AS NEEDED FOR SLEEP 30 tablet 0    metoprolol succinate (TOPROL-XL) 50 mg 24 hr tablet Take 25 mg by mouth daily       No current facility-administered medications for this visit  Allergies   Allergen Reactions    Clarithromycin Other (See Comments)     Other reaction(s): tongue lesions and burning  BIAXIN- tongue cracks & gets bumps      Penicillins Other (See Comments)     tongue cracks & gets bumps  Other reaction(s): tongues lesions and burning        Immunizations:     Immunization History   Administered Date(s) Administered    INFLUENZA 12/16/2011, 09/28/2012, 09/26/2014, 11/11/2016, 11/24/2017, 11/08/2018    Influenza, high dose seasonal 0 5 mL 11/08/2018      Medicare Screening Tests and Risk Assessments:     Lavonne Moon is here for her Subsequent Wellness visit  Health Risk Assessment:  Patient rates overall health as good   Patient feels that their physical health rating is Same  Eyesight was rated as Same  Hearing was rated as Same  Patient feels that their emotional and mental health rating is Same  Pain experienced by patient in the last 7 days has been None  Patient states that she has experienced no weight loss or gain in last 6 months  Emotional/Mental Health:  Patient has not been feeling nervous/anxious  PHQ-9 Depression Screening:    Frequency of the following problems over the past two weeks:      1  Little interest or pleasure in doing things: 0 - not at all      2  Feeling down, depressed, or hopeless: 0 - not at all  PHQ-2 Score: 0          Broken Bones/Falls: Fall Risk Assessment:    In the past year, patient has experienced: No history of falling in past year          Bladder/Bowel:  Patient has not leaked urine accidently in the last six months  Patient reports no loss of bowel control  Immunizations:  Patient has had a flu vaccination within the last year  Patient has received a pneumonia shot  Patient has not received a shingles shot  Patient has not received tetanus/diphtheria shot  Home Safety:  Patient does not have trouble with stairs inside or outside of their home  Patient currently reports that there are no safety hazards present in home, working smoke alarms, working carbon monoxide detectors  Preventative Screenings:   Breast cancer screening performed, colon cancer screen completed, cholesterol screen completed, glaucoma eye exam completed,     Nutrition:  Current diet: Regular with servings of the following:    Medications:  Patient is currently taking over-the-counter supplements  List of OTC medications includes: alive vitamin, calcuim, folic acid  Patient is able to manage medications  Lifestyle Choices:  Patient reports current tobacco use  Patient reports no alcohol use  Patient drives a vehicle  Patient wears seat belt          Activities of Daily Living:  Can get out of bed by his or her self, able to dress self, able to make own meals, able to do own shopping, able to bathe self, can do own laundry/housekeeping, can manage own money, pay bills and track expenses    Previous Hospitalizations:  No hospitalization or ED visit in past 12 months        Advanced Directives:  Patient has decided on a power of   Patient has spoken to designated power of   Patient has completed advanced directive  Preventative Screening/Counseling:      Cardiovascular:      General: Risks and Benefits Discussed      Counseling: Healthy Diet and Healthy Weight     Due for Labs/Analytes/Optional EKG: Lipid Panel, Cholesterol, Triglycerides and Lipoprotein          Diabetes:      General: Risks and Benefits Discussed      Counseling: Healthy Diet and Healthy Weight      Due for labs: Blood Glucose          Colorectal Cancer:      General: Risks and Benefits Discussed      Counseling: high fiber diet      Comments: Patient reports has had in the last year  Breast Cancer:      General: Risks and Benefits Discussed      Comments: Has had in the last year  Cervical Cancer:      General: Screening Not Indicated          Osteoporosis:      General: Screening Not Indicated          AAA:      General: Screening Not Indicated          Glaucoma:      General: Risks and Benefits Discussed and Screening Current          HIV:      General: Screening Not Indicated          Hepatitis C:      General: Screening Current and Risks and Benefits Discussed        Advanced Directives:   patient has an advanced directive  End of life assessment reviewed with patient  Provider agrees with end of life decisions        Immunizations:  Patient reviewed and up to date      Influenza: Risks & Benefits Discussed and Influenza Recommended Annually      Pneumococcal: Risks & Benefits Discussed and Lifetime Vaccine Completed      Shingrix: Risks & Benefits Discussed and Shingrix Vaccine Needed Today      Zostavax: Risks & Benefits Discussed and Zostavox Vaccine Needed Today      TD: Risks & Benefits Discussed and Vaccine Status Unknown      Other Preventative Counseling (Non-Medicare):   Fall Prevention, Nutrition Counseling, Car/seat belt/driving safety reviewed, Skin self-exam, Sunscreen use and Dietary education for weight gain

## 2019-07-17 NOTE — PROGRESS NOTES
[address] [facility]        NAME: Jessica Anthony is a 66 y o  female  : 1945    MRN: [id number]  DATE: 2019  TIME: 10:31 AM    Assessment and Plan   Medicare annual wellness visit, subsequent [Z00 00]  1  Medicare annual wellness visit, subsequent     2  Essential hypertension  Comprehensive metabolic panel   3  Rheumatoid arthritis, involving unspecified site, unspecified rheumatoid factor presence ([facility] [address] )     4  High cholesterol  Lipid panel   5  Impacted cerumen of left ear         Debrox drops as discussed to left ear  Patient Instructions     Patient Instructions   Follow up in 6 months  Get labs done, will call with results  Chief Complaint     Chief Complaint   Patient presents with    Follow-up     discuss meds         History of Present Illness       Patient here for follow up appointment to discuss medication  Ran out of Ambien, has not been seen in over 1 year  History of 3 MI, no chest pain with MI only has ear pain and neck pain  Urologist in [place]- history of Kidney stones  HTN- taking lipinopirl-HCTZ    Rheumatoid Arthritis, on Humara in the past, and also prednisone  Currently in a drug study on Baricitinib 2mg or 4mg    no labs in the last year                Review of Systems   Review of Systems   Constitutional: Negative for activity change, appetite change, chills, fatigue and fever  HENT: Negative for congestion, ear pain, nosebleeds, rhinorrhea and sore throat  Eyes: Negative for photophobia, pain, redness and visual disturbance  Respiratory: Negative for cough, shortness of breath and wheezing  Cardiovascular: Negative  Negative for chest pain  Gastrointestinal: Negative  Negative for abdominal pain, constipation, diarrhea and vomiting  Endocrine: Negative  Genitourinary: Negative for difficulty urinating, dysuria and flank pain  Musculoskeletal: Negative  Skin: Negative for color change and rash  Neurological: Negative for dizziness, weakness, numbness and headaches  Hematological: Negative for adenopathy  Psychiatric/Behavioral: Negative for agitation and confusion  The patient is not nervous/anxious          PHQ-9 Depression Screening    PHQ-9:    Frequency of the following problems over the past two weeks:       Little interest or pleasure in doing things:  0 - not at all  Feeling down, depressed, or hopeless:  0 - not at all  PHQ-2 Score:  0        Current Medications       Current Outpatient Medications:     aspirin (ECOTRIN LOW STRENGTH) 81 mg EC tablet, Take 81 mg by mouth daily, Disp: , Rfl:     levothyroxine 50 mcg tablet, TAKE ONE TABLET BY MOUTH ONCE DAILY, Disp: 90 tablet, Rfl: 2    lisinopril-hydrochlorothiazide (PRINZIDE,ZESTORETIC) 20-12 5 MG per tablet, TAKE ONE TABLET BY MOUTH ONCE DAILY, Disp: 90 tablet, Rfl: 2    methotrexate 2 5 MG tablet, Take 2 5 mg by mouth As directed, Disp: , Rfl:     metoprolol succinate (TOPROL-XL) 50 mg 24 hr tablet, TAKE ONE TABLET BY MOUTH ONCE DAILY, Disp: 90 tablet, Rfl: 2    nitroglycerin (NITROSTAT) 0 4 mg SL tablet, Place 1 tablet (0 4 mg total) under the tongue every 5 (five) minutes as needed for chest pain, Disp: 30 tablet, Rfl: 3    simvastatin (ZOCOR) 80 mg tablet, TAKE ONE TABLET BY MOUTH ONCE DAILY, Disp: 90 tablet, Rfl: 2    traMADol (ULTRAM) 50 mg tablet, TAKE ONE TABLET BY MOUTH ONCE DAILY, Disp: 30 tablet, Rfl: 0    zolpidem (AMBIEN) 10 mg tablet, TAKE ONE TABLET BY MOUTH AT BEDTIME AS NEEDED FOR SLEEP, Disp: 30 tablet, Rfl: 0    metoprolol succinate (TOPROL-XL) 50 mg 24 hr tablet, Take 25 mg by mouth daily, Disp: , Rfl:     Current Allergies     Allergies as of 07/17/2019 - Reviewed 07/17/2019   Allergen Reaction Noted    Clarithromycin Other (See Comments) 08/26/2011    Penicillins Other (See Comments) 08/26/2011            The following portions of the patient's history were reviewed and updated as appropriate: allergies, current medications, past family history, past medical history, past social history, past surgical history and problem list      Past Medical History:   Diagnosis Date    Atherosclerosis of CABG, unsp, w oth angina pectoris ([facility] [address] )     Cellulitis     Essential hypertension     Hypothyroidism     Myocardial infarction ([facility] [address] )     Rheumatoid arthritis ([facility] [address] )        Past Surgical History:   Procedure Laterality Date    CYSTOSCOPY      HYSTERECTOMY      total    OTHER SURGICAL HISTORY      heart stent    TONSILLECTOMY         History reviewed  No pertinent family history  Medications have been verified  Objective   /72   Pulse 69   Temp 99 °F (37 2 °C)   Resp 18   Ht 5' 7" (1 702 m)   Wt 78 kg (172 lb)   SpO2 98%   BMI 26 94 kg/m²        Physical Exam     Physical Exam   Constitutional: She is oriented to person, place, and time  She appears well-developed and well-nourished  She is cooperative  She does not appear ill  No distress  HENT:   Head: Normocephalic and atraumatic  Right Ear: Tympanic membrane, external ear and ear canal normal    Left Ear: External ear normal    Nose: Nose normal  No rhinorrhea  Mouth/Throat: Uvula is midline, oropharynx is clear and moist and mucous membranes are normal    + cerumen impaction left ear, use debrox drops   Eyes: Pupils are equal, round, and reactive to light  Conjunctivae, EOM and lids are normal    Cardiovascular: Normal rate, regular rhythm, S1 normal, S2 normal, normal heart sounds and intact distal pulses  Exam reveals no gallop and no friction rub  No murmur heard  Pulmonary/Chest: Effort normal and breath sounds normal  No respiratory distress  She has no decreased breath sounds  She has no wheezes  Abdominal: Soft  Normal appearance and bowel sounds are normal  She exhibits no distension and no mass  There is no tenderness  There is no rebound  Musculoskeletal: Normal range of motion   She exhibits no edema, tenderness or deformity  Neurological: She is alert and oriented to person, place, and time  Gross Neuro intact  Skin: Skin is warm  No rash noted  No erythema  Psychiatric: She has a normal mood and affect  Her behavior is normal  Thought content normal    Nursing note and vitals reviewed

## 2019-07-22 ENCOUNTER — TELEPHONE (OUTPATIENT)
Dept: FAMILY MEDICINE CLINIC | Facility: CLINIC | Age: 74
End: 2019-07-22

## 2019-07-24 DIAGNOSIS — G47.00 INSOMNIA, UNSPECIFIED TYPE: ICD-10-CM

## 2019-07-25 RX ORDER — ZOLPIDEM TARTRATE 10 MG/1
TABLET ORAL
Qty: 30 TABLET | Refills: 2 | Status: SHIPPED | OUTPATIENT
Start: 2019-07-25 | End: 2019-11-02 | Stop reason: SDUPTHER

## 2019-08-20 DIAGNOSIS — R52 PAIN: ICD-10-CM

## 2019-08-20 RX ORDER — TRAMADOL HYDROCHLORIDE 50 MG/1
50 TABLET ORAL DAILY
Qty: 30 TABLET | Refills: 0 | Status: SHIPPED | OUTPATIENT
Start: 2019-08-20 | End: 2019-08-21 | Stop reason: SDUPTHER

## 2019-08-21 DIAGNOSIS — R52 PAIN: ICD-10-CM

## 2019-08-21 RX ORDER — TRAMADOL HYDROCHLORIDE 50 MG/1
50 TABLET ORAL DAILY
Qty: 30 TABLET | Refills: 0 | Status: SHIPPED | OUTPATIENT
Start: 2019-08-21 | End: 2019-08-27 | Stop reason: SDUPTHER

## 2019-08-27 DIAGNOSIS — R52 PAIN: ICD-10-CM

## 2019-08-27 RX ORDER — TRAMADOL HYDROCHLORIDE 50 MG/1
50 TABLET ORAL DAILY
Qty: 30 TABLET | Refills: 0 | Status: SHIPPED | OUTPATIENT
Start: 2019-08-27 | End: 2019-10-18 | Stop reason: SDUPTHER

## 2019-09-06 DIAGNOSIS — E03.9 HYPOTHYROIDISM, UNSPECIFIED TYPE: ICD-10-CM

## 2019-09-06 DIAGNOSIS — I10 ESSENTIAL HYPERTENSION: ICD-10-CM

## 2019-09-09 RX ORDER — LEVOTHYROXINE SODIUM 0.05 MG/1
TABLET ORAL
Qty: 90 TABLET | Refills: 1 | Status: SHIPPED | OUTPATIENT
Start: 2019-09-09 | End: 2020-03-24

## 2019-09-09 RX ORDER — LISINOPRIL AND HYDROCHLOROTHIAZIDE 20; 12.5 MG/1; MG/1
TABLET ORAL
Qty: 90 TABLET | Refills: 1 | Status: SHIPPED | OUTPATIENT
Start: 2019-09-09 | End: 2020-03-17

## 2019-10-18 DIAGNOSIS — R52 PAIN: ICD-10-CM

## 2019-10-22 RX ORDER — TRAMADOL HYDROCHLORIDE 50 MG/1
TABLET ORAL
Qty: 30 TABLET | Refills: 0 | Status: SHIPPED | OUTPATIENT
Start: 2019-10-22 | End: 2020-01-14 | Stop reason: SDUPTHER

## 2019-10-30 DIAGNOSIS — E78.00 HYPERCHOLESTEROLEMIA: ICD-10-CM

## 2019-10-30 RX ORDER — SIMVASTATIN 80 MG
80 TABLET ORAL DAILY
Qty: 90 TABLET | Refills: 2 | Status: SHIPPED | OUTPATIENT
Start: 2019-10-30

## 2019-11-02 DIAGNOSIS — G47.00 INSOMNIA, UNSPECIFIED TYPE: ICD-10-CM

## 2019-11-04 RX ORDER — ZOLPIDEM TARTRATE 10 MG/1
TABLET ORAL
Qty: 30 TABLET | Refills: 1 | Status: SHIPPED | OUTPATIENT
Start: 2019-11-04 | End: 2019-11-05 | Stop reason: SDUPTHER

## 2019-11-05 DIAGNOSIS — G47.00 INSOMNIA, UNSPECIFIED TYPE: ICD-10-CM

## 2019-11-05 RX ORDER — ZOLPIDEM TARTRATE 10 MG/1
10 TABLET ORAL
Qty: 30 TABLET | Refills: 0 | Status: SHIPPED | OUTPATIENT
Start: 2019-11-05 | End: 2020-02-11 | Stop reason: SDUPTHER

## 2020-01-01 ENCOUNTER — TRANSCRIBE ORDERS (OUTPATIENT)
Dept: URGENT CARE | Facility: CLINIC | Age: 75
End: 2020-01-01

## 2020-01-01 ENCOUNTER — TELEPHONE (OUTPATIENT)
Dept: FAMILY MEDICINE CLINIC | Facility: CLINIC | Age: 75
End: 2020-01-01

## 2020-01-01 ENCOUNTER — APPOINTMENT (OUTPATIENT)
Dept: LAB | Facility: CLINIC | Age: 75
End: 2020-01-01
Payer: COMMERCIAL

## 2020-01-01 DIAGNOSIS — E78.5 HYPERLIPIDEMIA, UNSPECIFIED HYPERLIPIDEMIA TYPE: ICD-10-CM

## 2020-01-01 DIAGNOSIS — G25.81 RESTLESS LEGS SYNDROME: ICD-10-CM

## 2020-01-01 DIAGNOSIS — I10 ESSENTIAL HYPERTENSION, MALIGNANT: ICD-10-CM

## 2020-01-01 DIAGNOSIS — E03.9 HYPOTHYROIDISM, UNSPECIFIED TYPE: ICD-10-CM

## 2020-01-01 DIAGNOSIS — I10 ESSENTIAL HYPERTENSION, MALIGNANT: Primary | ICD-10-CM

## 2020-01-01 DIAGNOSIS — G47.00 INSOMNIA, UNSPECIFIED TYPE: ICD-10-CM

## 2020-01-01 DIAGNOSIS — I10 ESSENTIAL HYPERTENSION: Primary | ICD-10-CM

## 2020-01-01 LAB
25(OH)D3 SERPL-MCNC: 45.7 NG/ML (ref 30–100)
ALBUMIN SERPL BCP-MCNC: 3.4 G/DL (ref 3.5–5)
ALP SERPL-CCNC: 73 U/L (ref 46–116)
ALT SERPL W P-5'-P-CCNC: 31 U/L (ref 12–78)
ANION GAP SERPL CALCULATED.3IONS-SCNC: 6 MMOL/L (ref 4–13)
AST SERPL W P-5'-P-CCNC: 33 U/L (ref 5–45)
BASOPHILS # BLD AUTO: 0.05 THOUSANDS/ΜL (ref 0–0.1)
BASOPHILS NFR BLD AUTO: 1 % (ref 0–1)
BILIRUB SERPL-MCNC: 0.51 MG/DL (ref 0.2–1)
BILIRUB UR QL STRIP: NEGATIVE
BUN SERPL-MCNC: 18 MG/DL (ref 5–25)
CALCIUM SERPL-MCNC: 9.7 MG/DL (ref 8.3–10.1)
CHLORIDE SERPL-SCNC: 108 MMOL/L (ref 100–108)
CHOLEST SERPL-MCNC: 126 MG/DL (ref 50–200)
CLARITY UR: CLEAR
CO2 SERPL-SCNC: 25 MMOL/L (ref 21–32)
COLOR UR: ABNORMAL
CREAT SERPL-MCNC: 0.91 MG/DL (ref 0.6–1.3)
CRP SERPL QL: <3 MG/L
EOSINOPHIL # BLD AUTO: 0.15 THOUSAND/ΜL (ref 0–0.61)
EOSINOPHIL NFR BLD AUTO: 2 % (ref 0–6)
ERYTHROCYTE [DISTWIDTH] IN BLOOD BY AUTOMATED COUNT: 14.4 % (ref 11.6–15.1)
ERYTHROCYTE [SEDIMENTATION RATE] IN BLOOD: 19 MM/HOUR (ref 0–20)
GFR SERPL CREATININE-BSD FRML MDRD: 61 ML/MIN/1.73SQ M
GLUCOSE SERPL-MCNC: 114 MG/DL (ref 65–140)
GLUCOSE UR STRIP-MCNC: NEGATIVE MG/DL
HCT VFR BLD AUTO: 37.8 % (ref 34.8–46.1)
HDLC SERPL-MCNC: 51 MG/DL
HGB BLD-MCNC: 12.6 G/DL (ref 11.5–15.4)
HGB UR QL STRIP.AUTO: NEGATIVE
IMM GRANULOCYTES # BLD AUTO: 0.03 THOUSAND/UL (ref 0–0.2)
IMM GRANULOCYTES NFR BLD AUTO: 0 % (ref 0–2)
KETONES UR STRIP-MCNC: ABNORMAL MG/DL
LDLC SERPL CALC-MCNC: 60 MG/DL (ref 0–100)
LEUKOCYTE ESTERASE UR QL STRIP: NEGATIVE
LYMPHOCYTES # BLD AUTO: 1.37 THOUSANDS/ΜL (ref 0.6–4.47)
LYMPHOCYTES NFR BLD AUTO: 20 % (ref 14–44)
MCH RBC QN AUTO: 33.2 PG (ref 26.8–34.3)
MCHC RBC AUTO-ENTMCNC: 33.3 G/DL (ref 31.4–37.4)
MCV RBC AUTO: 100 FL (ref 82–98)
MONOCYTES # BLD AUTO: 0.85 THOUSAND/ΜL (ref 0.17–1.22)
MONOCYTES NFR BLD AUTO: 13 % (ref 4–12)
NEUTROPHILS # BLD AUTO: 4.31 THOUSANDS/ΜL (ref 1.85–7.62)
NEUTS SEG NFR BLD AUTO: 64 % (ref 43–75)
NITRITE UR QL STRIP: NEGATIVE
NONHDLC SERPL-MCNC: 75 MG/DL
NRBC BLD AUTO-RTO: 0 /100 WBCS
PH UR STRIP.AUTO: 7 [PH]
PLATELET # BLD AUTO: 283 THOUSANDS/UL (ref 149–390)
PMV BLD AUTO: 10.7 FL (ref 8.9–12.7)
POTASSIUM SERPL-SCNC: 3.7 MMOL/L (ref 3.5–5.3)
PROT SERPL-MCNC: 7 G/DL (ref 6.4–8.2)
PROT UR STRIP-MCNC: NEGATIVE MG/DL
RBC # BLD AUTO: 3.8 MILLION/UL (ref 3.81–5.12)
SODIUM SERPL-SCNC: 139 MMOL/L (ref 136–145)
SP GR UR STRIP.AUTO: 1.02 (ref 1–1.03)
T4 FREE SERPL-MCNC: 0.95 NG/DL (ref 0.76–1.46)
TRIGL SERPL-MCNC: 77 MG/DL
TSH SERPL DL<=0.05 MIU/L-ACNC: 2.11 UIU/ML (ref 0.36–3.74)
UROBILINOGEN UR QL STRIP.AUTO: 1 E.U./DL
WBC # BLD AUTO: 6.76 THOUSAND/UL (ref 4.31–10.16)

## 2020-01-01 PROCEDURE — 80061 LIPID PANEL: CPT

## 2020-01-01 PROCEDURE — 85652 RBC SED RATE AUTOMATED: CPT

## 2020-01-01 PROCEDURE — 82306 VITAMIN D 25 HYDROXY: CPT

## 2020-01-01 PROCEDURE — 86140 C-REACTIVE PROTEIN: CPT

## 2020-01-01 PROCEDURE — 84443 ASSAY THYROID STIM HORMONE: CPT

## 2020-01-01 PROCEDURE — 80053 COMPREHEN METABOLIC PANEL: CPT

## 2020-01-01 PROCEDURE — 81003 URINALYSIS AUTO W/O SCOPE: CPT

## 2020-01-01 PROCEDURE — 36415 COLL VENOUS BLD VENIPUNCTURE: CPT

## 2020-01-01 PROCEDURE — 85025 COMPLETE CBC W/AUTO DIFF WBC: CPT

## 2020-01-01 PROCEDURE — 84439 ASSAY OF FREE THYROXINE: CPT

## 2020-01-01 RX ORDER — LEVOTHYROXINE SODIUM 0.05 MG/1
50 TABLET ORAL DAILY
Qty: 90 TABLET | Refills: 0 | Status: SHIPPED | OUTPATIENT
Start: 2020-01-01 | End: 2021-01-01 | Stop reason: SDUPTHER

## 2020-01-01 RX ORDER — PRAMIPEXOLE DIHYDROCHLORIDE 1 MG/1
1 TABLET ORAL
Qty: 30 TABLET | Refills: 2 | Status: ON HOLD | OUTPATIENT
Start: 2020-01-01 | End: 2021-01-01 | Stop reason: CLARIF

## 2020-01-01 RX ORDER — METOPROLOL SUCCINATE 50 MG/1
25 TABLET, EXTENDED RELEASE ORAL DAILY
Qty: 90 TABLET | Refills: 2 | Status: SHIPPED | OUTPATIENT
Start: 2020-01-01

## 2020-01-01 RX ORDER — PRAMIPEXOLE DIHYDROCHLORIDE 1 MG/1
0.5 TABLET ORAL
Qty: 30 TABLET | Refills: 2 | Status: SHIPPED | OUTPATIENT
Start: 2020-01-01 | End: 2020-01-01 | Stop reason: SDUPTHER

## 2020-01-01 RX ORDER — ZOLPIDEM TARTRATE 10 MG/1
10 TABLET ORAL
Qty: 30 TABLET | Refills: 0 | Status: SHIPPED | OUTPATIENT
Start: 2020-01-01

## 2020-01-13 DIAGNOSIS — R52 PAIN: ICD-10-CM

## 2020-01-14 DIAGNOSIS — R52 PAIN: ICD-10-CM

## 2020-01-14 NOTE — TELEPHONE ENCOUNTER
Patient needs refills on her Tramadol 50mg takes 1 tab daily by mouth # 30 With No Refills sent to [facility]

## 2020-01-15 RX ORDER — TRAMADOL HYDROCHLORIDE 50 MG/1
50 TABLET ORAL DAILY
Qty: 30 TABLET | Refills: 0 | Status: ON HOLD | OUTPATIENT
Start: 2020-01-15 | End: 2021-01-01 | Stop reason: CLARIF

## 2020-01-15 RX ORDER — TRAMADOL HYDROCHLORIDE 50 MG/1
TABLET ORAL
Qty: 30 TABLET | Refills: 0 | OUTPATIENT
Start: 2020-01-15

## 2020-02-04 DIAGNOSIS — G47.00 INSOMNIA, UNSPECIFIED TYPE: ICD-10-CM

## 2020-02-06 NOTE — TELEPHONE ENCOUNTER
Patient scheduled appointment for Tuesday, I explained that she needs to be seen in order for refill to be completed, please deny until patients appointment as one was just no showed

## 2020-02-11 ENCOUNTER — OFFICE VISIT (OUTPATIENT)
Dept: FAMILY MEDICINE CLINIC | Facility: CLINIC | Age: 75
End: 2020-02-11
Payer: COMMERCIAL

## 2020-02-11 VITALS
DIASTOLIC BLOOD PRESSURE: 78 MMHG | HEART RATE: 77 BPM | WEIGHT: 180 LBS | RESPIRATION RATE: 18 BRPM | BODY MASS INDEX: 28.25 KG/M2 | OXYGEN SATURATION: 98 % | TEMPERATURE: 99 F | SYSTOLIC BLOOD PRESSURE: 136 MMHG | HEIGHT: 67 IN

## 2020-02-11 DIAGNOSIS — G47.00 INSOMNIA, UNSPECIFIED TYPE: ICD-10-CM

## 2020-02-11 DIAGNOSIS — M06.9 RHEUMATOID ARTHRITIS, INVOLVING UNSPECIFIED SITE, UNSPECIFIED RHEUMATOID FACTOR PRESENCE: ICD-10-CM

## 2020-02-11 DIAGNOSIS — I10 ESSENTIAL HYPERTENSION: ICD-10-CM

## 2020-02-11 DIAGNOSIS — E78.5 HYPERLIPIDEMIA, UNSPECIFIED HYPERLIPIDEMIA TYPE: ICD-10-CM

## 2020-02-11 DIAGNOSIS — G25.81 RESTLESS LEGS SYNDROME: Primary | ICD-10-CM

## 2020-02-11 DIAGNOSIS — J44.9 CHRONIC OBSTRUCTIVE PULMONARY DISEASE, UNSPECIFIED COPD TYPE (HCC): ICD-10-CM

## 2020-02-11 PROCEDURE — 1160F RVW MEDS BY RX/DR IN RCRD: CPT | Performed by: NURSE PRACTITIONER

## 2020-02-11 PROCEDURE — 3078F DIAST BP <80 MM HG: CPT | Performed by: NURSE PRACTITIONER

## 2020-02-11 PROCEDURE — 3075F SYST BP GE 130 - 139MM HG: CPT | Performed by: NURSE PRACTITIONER

## 2020-02-11 PROCEDURE — 3008F BODY MASS INDEX DOCD: CPT | Performed by: NURSE PRACTITIONER

## 2020-02-11 PROCEDURE — 99214 OFFICE O/P EST MOD 30 MIN: CPT | Performed by: NURSE PRACTITIONER

## 2020-02-11 RX ORDER — PRAMIPEXOLE DIHYDROCHLORIDE 1 MG/1
0.5 TABLET ORAL
Qty: 30 TABLET | Refills: 2 | Status: SHIPPED | OUTPATIENT
Start: 2020-02-11 | End: 2020-01-01 | Stop reason: SDUPTHER

## 2020-02-11 RX ORDER — ZOLPIDEM TARTRATE 10 MG/1
10 TABLET ORAL
Qty: 30 TABLET | Refills: 0 | Status: SHIPPED | OUTPATIENT
Start: 2020-02-11 | End: 2020-03-13 | Stop reason: SDUPTHER

## 2020-02-11 NOTE — PROGRESS NOTES
[address] [facility]        NAME: Siobhan Mccarty is a 66 y o  female  : 1945    MRN: [id number]  DATE: 2020  TIME: 1:47 PM    Assessment and Plan   Restless legs syndrome [G25 81]  1  Restless legs syndrome  pramipexole (MIRAPEX) 1 mg tablet   2  Rheumatoid arthritis, involving unspecified site, unspecified rheumatoid factor presence ([facility] )     3  Chronic obstructive pulmonary disease, unspecified COPD type ([facility] )     4  Hyperlipidemia, unspecified hyperlipidemia type  Lipid panel   5  Essential hypertension  Comprehensive metabolic panel   6  Insomnia, unspecified type  zolpidem (AMBIEN) 10 mg tablet     Ambien reordered  Tramadol discontinued and Mirapex started for RLS  Follow up in 3 months to review medication for RLS  PDMP checked  Last refilled 1/3/20  Patient Instructions     Patient Instructions   Complete ordered bloodwork          Chief Complaint     Chief Complaint   Patient presents with    Follow-up     med refills          History of Present Illness       Patient is here for follow up regarding their medications    Insomnia-Patient has been taking Ambien and reports that this helps her to sleep  She does not take the medication every day  RLS-Patient currently taking Tramadol for restless leg symptoms  The patient states that their legs get restless in the evening and "jump " Denies any paraesthesias or pain in the feet or legs  Reports being on a medication for restless legs in the past that made them have an increased desire to irby  Stress incontinence-Patient educated to perform Kegel exercises multiple times a day such as when they are driving and stop at a stop light  Hypertension-Well controlled on Lisinopril/HCTZ  Denies headaches, visual changes, or lightheadedness  CMP ordered  Hyperlipidemia-Lipid panel has not been checked in over a year  Lipid panel ordered  RA-Well controlled on Methotrexate       COPD-Does not use any inhalers and reports that they rarely feel SOB  Denies chest pain or dyspnea with exertion  Review of Systems   Review of Systems   Constitutional: Negative for activity change, appetite change, chills, fatigue and fever  HENT: Negative for congestion, ear pain, nosebleeds, rhinorrhea and sore throat  Eyes: Negative for photophobia, pain, redness and visual disturbance  Respiratory: Negative for cough, shortness of breath and wheezing  Cardiovascular: Negative  Negative for chest pain  Gastrointestinal: Negative  Negative for abdominal pain, constipation, diarrhea and vomiting  Endocrine: Negative  Genitourinary: Negative for difficulty urinating, dysuria and flank pain  Musculoskeletal: Negative  Skin: Negative for color change and rash  Neurological: Negative for dizziness, weakness, numbness and headaches  Hematological: Negative for adenopathy  Psychiatric/Behavioral: Negative for agitation and confusion  The patient is not nervous/anxious          PHQ-9 Depression Screening    PHQ-9:    Frequency of the following problems over the past two weeks:       Little interest or pleasure in doing things:  0 - not at all  Feeling down, depressed, or hopeless:  0 - not at all  PHQ-2 Score:  0        Current Medications       Current Outpatient Medications:     aspirin (ECOTRIN LOW STRENGTH) 81 mg EC tablet, Take 81 mg by mouth daily, Disp: , Rfl:     levothyroxine 50 mcg tablet, TAKE ONE TABLET BY MOUTH ONCE DAILY, Disp: 90 tablet, Rfl: 1    lisinopril-hydrochlorothiazide (PRINZIDE,ZESTORETIC) 20-12 5 MG per tablet, TAKE ONE TABLET BY MOUTH ONCE DAILY, Disp: 90 tablet, Rfl: 1    methotrexate 2 5 MG tablet, Take 2 5 mg by mouth As directed, Disp: , Rfl:     metoprolol succinate (TOPROL-XL) 50 mg 24 hr tablet, Take 25 mg by mouth daily, Disp: , Rfl:     metoprolol succinate (TOPROL-XL) 50 mg 24 hr tablet, TAKE ONE TABLET BY MOUTH ONCE DAILY, Disp: 90 tablet, Rfl: 2    nitroglycerin (NITROSTAT) 0 4 mg SL tablet, Place 1 tablet (0 4 mg total) under the tongue every 5 (five) minutes as needed for chest pain, Disp: 30 tablet, Rfl: 3    simvastatin (ZOCOR) 80 mg tablet, Take 1 tablet (80 mg total) by mouth daily, Disp: 90 tablet, Rfl: 2    traMADol (ULTRAM) 50 mg tablet, Take 1 tablet (50 mg total) by mouth daily, Disp: 30 tablet, Rfl: 0    zolpidem (AMBIEN) 10 mg tablet, Take 1 tablet (10 mg total) by mouth daily at bedtime as needed for sleep, Disp: 30 tablet, Rfl: 0    pramipexole (MIRAPEX) 1 mg tablet, Take 0 5 tablets (0 5 mg total) by mouth daily at bedtime, Disp: 30 tablet, Rfl: 2    Current Allergies     Allergies as of 02/11/2020 - Reviewed 02/11/2020   Allergen Reaction Noted    Clarithromycin Other (See Comments) 08/26/2011    Penicillins Other (See Comments) 08/26/2011            The following portions of the patient's history were reviewed and updated as appropriate: allergies, current medications, past family history, past medical history, past social history, past surgical history and problem list      Past Medical History:   Diagnosis Date    Atherosclerosis of CABG, unsp, w oth angina pectoris ([facility] [address] )     Cellulitis     Essential hypertension     Hypothyroidism     Myocardial infarction ([facility] [address] )     Rheumatoid arthritis ([facility] [address] )        Past Surgical History:   Procedure Laterality Date    CYSTOSCOPY      HYSTERECTOMY      total    OTHER SURGICAL HISTORY      heart stent    TONSILLECTOMY         History reviewed  No pertinent family history  Medications have been verified  Objective   /78   Pulse 77   Temp 99 °F (37 2 °C)   Resp 18   Ht 5' 7" (1 702 m)   Wt 81 6 kg (180 lb)   SpO2 98%   BMI 28 19 kg/m²        Physical Exam     Physical Exam   Constitutional: She is oriented to person, place, and time  She appears well-developed and well-nourished  She is cooperative  She does not appear ill  No distress     Eyes: Lids are normal    Cardiovascular: Normal rate, regular rhythm, S1 normal, S2 normal, normal heart sounds and intact distal pulses  Exam reveals no gallop and no friction rub  No murmur heard  Pulmonary/Chest: Effort normal and breath sounds normal  No respiratory distress  She has no decreased breath sounds  She has no wheezes  Abdominal: Normal appearance  Musculoskeletal: Normal range of motion  She exhibits no edema, tenderness or deformity  Neurological: She is alert and oriented to person, place, and time  Skin: Skin is warm  No rash noted  No erythema  Psychiatric: She has a normal mood and affect  Her behavior is normal  Thought content normal    Nursing note and vitals reviewed

## 2020-02-12 RX ORDER — ZOLPIDEM TARTRATE 10 MG/1
TABLET ORAL
Qty: 30 TABLET | Refills: 0 | OUTPATIENT
Start: 2020-02-12

## 2020-02-13 ENCOUNTER — NEW PATIENT (OUTPATIENT)
Dept: URBAN - METROPOLITAN AREA CLINIC 27 | Facility: CLINIC | Age: 75
End: 2020-02-13

## 2020-02-13 DIAGNOSIS — H35.62: ICD-10-CM

## 2020-02-13 DIAGNOSIS — H25.13: ICD-10-CM

## 2020-02-13 PROCEDURE — 92134 CPTRZ OPH DX IMG PST SGM RTA: CPT

## 2020-02-13 PROCEDURE — 99204 OFFICE O/P NEW MOD 45 MIN: CPT

## 2020-02-13 PROCEDURE — 92250 FUNDUS PHOTOGRAPHY W/I&R: CPT

## 2020-02-13 PROCEDURE — 92235 FLUORESCEIN ANGRPH MLTIFRAME: CPT

## 2020-02-13 ASSESSMENT — TONOMETRY
OS_IOP_MMHG: 19
OD_IOP_MMHG: 19

## 2020-02-13 ASSESSMENT — VISUAL ACUITY
OD_PH: 20/30
OD_SC: 20/50
OS_SC: 20/400

## 2020-03-13 DIAGNOSIS — G47.00 INSOMNIA, UNSPECIFIED TYPE: ICD-10-CM

## 2020-03-16 DIAGNOSIS — I10 ESSENTIAL HYPERTENSION: ICD-10-CM

## 2020-03-17 RX ORDER — LISINOPRIL AND HYDROCHLOROTHIAZIDE 20; 12.5 MG/1; MG/1
TABLET ORAL
Qty: 90 TABLET | Refills: 0 | Status: ON HOLD | OUTPATIENT
Start: 2020-03-17 | End: 2021-01-01 | Stop reason: CLARIF

## 2020-03-17 RX ORDER — ZOLPIDEM TARTRATE 10 MG/1
10 TABLET ORAL
Qty: 30 TABLET | Refills: 1 | Status: SHIPPED | OUTPATIENT
Start: 2020-03-17 | End: 2020-01-01 | Stop reason: SDUPTHER

## 2020-03-24 DIAGNOSIS — E03.9 HYPOTHYROIDISM, UNSPECIFIED TYPE: ICD-10-CM

## 2020-03-24 RX ORDER — LEVOTHYROXINE SODIUM 0.05 MG/1
TABLET ORAL
Qty: 90 TABLET | Refills: 1 | Status: SHIPPED | OUTPATIENT
Start: 2020-03-24 | End: 2020-03-24 | Stop reason: SDUPTHER

## 2020-03-24 NOTE — TELEPHONE ENCOUNTER
Also needs Toprol XL as well as Levothyroxine    Wants her scripts called [facility] [place]

## 2020-03-25 DIAGNOSIS — I25.10 CORONARY ARTERY DISEASE INVOLVING NATIVE CORONARY ARTERY OF NATIVE HEART WITHOUT ANGINA PECTORIS: ICD-10-CM

## 2020-03-25 RX ORDER — METOPROLOL SUCCINATE 50 MG/1
50 TABLET, EXTENDED RELEASE ORAL DAILY
Qty: 90 TABLET | Refills: 1 | Status: ON HOLD | OUTPATIENT
Start: 2020-03-25 | End: 2021-01-01 | Stop reason: CLARIF

## 2020-03-25 RX ORDER — LEVOTHYROXINE SODIUM 0.05 MG/1
50 TABLET ORAL DAILY
Qty: 90 TABLET | Refills: 1 | Status: SHIPPED | OUTPATIENT
Start: 2020-03-25 | End: 2020-04-02

## 2020-03-25 NOTE — TELEPHONE ENCOUNTER
Please call patient to see if she is still taking the Toprol XL 50mg daily as this has not been refilled since 4/2019   If she is please enter script

## 2020-04-01 DIAGNOSIS — E03.9 HYPOTHYROIDISM, UNSPECIFIED TYPE: ICD-10-CM

## 2020-04-02 RX ORDER — LEVOTHYROXINE SODIUM 0.05 MG/1
TABLET ORAL
Qty: 90 TABLET | Refills: 0 | Status: SHIPPED | OUTPATIENT
Start: 2020-04-02 | End: 2020-01-01 | Stop reason: SDUPTHER

## 2021-01-01 ENCOUNTER — APPOINTMENT (INPATIENT)
Dept: NON INVASIVE DIAGNOSTICS | Facility: HOSPITAL | Age: 76
DRG: 247 | End: 2021-01-01
Payer: COMMERCIAL

## 2021-01-01 ENCOUNTER — APPOINTMENT (INPATIENT)
Dept: CT IMAGING | Facility: HOSPITAL | Age: 76
DRG: 247 | End: 2021-01-01
Payer: COMMERCIAL

## 2021-01-01 ENCOUNTER — HOSPITAL ENCOUNTER (EMERGENCY)
Facility: HOSPITAL | Age: 76
End: 2021-04-14
Attending: INTERNAL MEDICINE
Payer: COMMERCIAL

## 2021-01-01 ENCOUNTER — OFFICE VISIT (OUTPATIENT)
Dept: URGENT CARE | Facility: CLINIC | Age: 76
End: 2021-01-01
Payer: COMMERCIAL

## 2021-01-01 ENCOUNTER — APPOINTMENT (EMERGENCY)
Dept: RADIOLOGY | Facility: HOSPITAL | Age: 76
End: 2021-01-01
Payer: COMMERCIAL

## 2021-01-01 ENCOUNTER — HOSPITAL ENCOUNTER (INPATIENT)
Facility: HOSPITAL | Age: 76
LOS: 2 days | DRG: 247 | End: 2021-04-16
Attending: FAMILY MEDICINE | Admitting: FAMILY MEDICINE
Payer: COMMERCIAL

## 2021-01-01 ENCOUNTER — TELEPHONE (OUTPATIENT)
Dept: FAMILY MEDICINE CLINIC | Facility: CLINIC | Age: 76
End: 2021-01-01

## 2021-01-01 VITALS
TEMPERATURE: 99 F | BODY MASS INDEX: 30.96 KG/M2 | HEART RATE: 130 BPM | DIASTOLIC BLOOD PRESSURE: 63 MMHG | HEIGHT: 65 IN | WEIGHT: 185.85 LBS | OXYGEN SATURATION: 95 % | RESPIRATION RATE: 18 BRPM | SYSTOLIC BLOOD PRESSURE: 130 MMHG

## 2021-01-01 VITALS
HEIGHT: 65 IN | TEMPERATURE: 99.3 F | BODY MASS INDEX: 29.99 KG/M2 | DIASTOLIC BLOOD PRESSURE: 78 MMHG | RESPIRATION RATE: 20 BRPM | SYSTOLIC BLOOD PRESSURE: 140 MMHG | OXYGEN SATURATION: 95 % | HEART RATE: 102 BPM | WEIGHT: 180 LBS

## 2021-01-01 VITALS
HEIGHT: 65 IN | SYSTOLIC BLOOD PRESSURE: 134 MMHG | OXYGEN SATURATION: 99 % | WEIGHT: 180 LBS | TEMPERATURE: 100.5 F | RESPIRATION RATE: 29 BRPM | BODY MASS INDEX: 29.99 KG/M2 | HEART RATE: 69 BPM | DIASTOLIC BLOOD PRESSURE: 75 MMHG

## 2021-01-01 DIAGNOSIS — I25.119 CORONARY ARTERY DISEASE INVOLVING NATIVE CORONARY ARTERY OF NATIVE HEART WITH ANGINA PECTORIS (HCC): Chronic | ICD-10-CM

## 2021-01-01 DIAGNOSIS — E03.9 HYPOTHYROIDISM, UNSPECIFIED TYPE: ICD-10-CM

## 2021-01-01 DIAGNOSIS — R52 GENERALIZED BODY ACHES: Primary | ICD-10-CM

## 2021-01-01 DIAGNOSIS — R07.89 CHEST PRESSURE: ICD-10-CM

## 2021-01-01 DIAGNOSIS — I21.4 NON-ST ELEVATED MYOCARDIAL INFARCTION (NON-STEMI) (HCC): Primary | ICD-10-CM

## 2021-01-01 DIAGNOSIS — I21.4 NSTEMI (NON-ST ELEVATED MYOCARDIAL INFARCTION) (HCC): Primary | ICD-10-CM

## 2021-01-01 DIAGNOSIS — R65.10 SIRS (SYSTEMIC INFLAMMATORY RESPONSE SYNDROME) (HCC): ICD-10-CM

## 2021-01-01 LAB
ALBUMIN SERPL BCP-MCNC: 2.9 G/DL (ref 3.5–5)
ALBUMIN SERPL BCP-MCNC: 4 G/DL (ref 3.5–5.7)
ALP SERPL-CCNC: 49 U/L (ref 55–165)
ALP SERPL-CCNC: 51 U/L (ref 46–116)
ALT SERPL W P-5'-P-CCNC: 44 U/L (ref 7–52)
ALT SERPL W P-5'-P-CCNC: 57 U/L (ref 12–78)
ANION GAP SERPL CALCULATED.3IONS-SCNC: 11 MMOL/L (ref 4–13)
APTT PPP: 105 SECONDS (ref 23–37)
APTT PPP: 28 SECONDS (ref 23–37)
APTT PPP: 31 SECONDS (ref 23–37)
APTT PPP: 47 SECONDS (ref 23–37)
APTT PPP: 74 SECONDS (ref 23–37)
AST SERPL W P-5'-P-CCNC: 266 U/L (ref 13–39)
AST SERPL W P-5'-P-CCNC: 336 U/L (ref 5–45)
ATRIAL RATE: 106 BPM
ATRIAL RATE: 107 BPM
ATRIAL RATE: 107 BPM
ATRIAL RATE: 108 BPM
ATRIAL RATE: 141 BPM
ATRIAL RATE: 75 BPM
ATRIAL RATE: 90 BPM
ATRIAL RATE: 98 BPM
ATRIAL RATE: 99 BPM
BACTERIA UR QL AUTO: NORMAL /HPF
BASOPHILS # BLD AUTO: 0.03 THOUSANDS/ΜL (ref 0–0.1)
BASOPHILS # BLD AUTO: 0.03 THOUSANDS/ΜL (ref 0–0.1)
BASOPHILS # BLD AUTO: 0.1 THOUSANDS/ΜL (ref 0–0.1)
BASOPHILS NFR BLD AUTO: 0 % (ref 0–1)
BASOPHILS NFR BLD AUTO: 0 % (ref 0–1)
BASOPHILS NFR BLD AUTO: 1 % (ref 0–2)
BILIRUB SERPL-MCNC: 1 MG/DL (ref 0.2–1)
BILIRUB SERPL-MCNC: 1.32 MG/DL (ref 0.2–1)
BILIRUB UR QL STRIP: NEGATIVE
BUN SERPL-MCNC: 14 MG/DL (ref 5–25)
BUN SERPL-MCNC: 14 MG/DL (ref 7–25)
BUN SERPL-MCNC: 16 MG/DL (ref 5–25)
CALCIUM ALBUM COR SERPL-MCNC: 9.4 MG/DL (ref 8.3–10.1)
CALCIUM SERPL-MCNC: 8.5 MG/DL (ref 8.3–10.1)
CALCIUM SERPL-MCNC: 8.6 MG/DL (ref 8.3–10.1)
CALCIUM SERPL-MCNC: 9.4 MG/DL (ref 8.6–10.5)
CHLORIDE SERPL-SCNC: 100 MMOL/L (ref 100–108)
CHLORIDE SERPL-SCNC: 101 MMOL/L (ref 100–108)
CHLORIDE SERPL-SCNC: 98 MMOL/L (ref 98–107)
CHOLEST SERPL-MCNC: 108 MG/DL (ref 50–200)
CLARITY UR: CLEAR
CO2 SERPL-SCNC: 22 MMOL/L (ref 21–31)
CO2 SERPL-SCNC: 23 MMOL/L (ref 21–32)
CO2 SERPL-SCNC: 23 MMOL/L (ref 21–32)
COLOR UR: ABNORMAL
CREAT SERPL-MCNC: 0.8 MG/DL (ref 0.6–1.3)
CREAT SERPL-MCNC: 0.84 MG/DL (ref 0.6–1.2)
CREAT SERPL-MCNC: 0.88 MG/DL (ref 0.6–1.3)
EOSINOPHIL # BLD AUTO: 0 THOUSAND/ΜL (ref 0–0.61)
EOSINOPHIL # BLD AUTO: 0.01 THOUSAND/ΜL (ref 0–0.61)
EOSINOPHIL # BLD AUTO: 0.08 THOUSAND/ΜL (ref 0–0.61)
EOSINOPHIL NFR BLD AUTO: 0 % (ref 0–5)
EOSINOPHIL NFR BLD AUTO: 0 % (ref 0–6)
EOSINOPHIL NFR BLD AUTO: 1 % (ref 0–6)
ERYTHROCYTE [DISTWIDTH] IN BLOOD BY AUTOMATED COUNT: 15 % (ref 11.5–14.5)
ERYTHROCYTE [DISTWIDTH] IN BLOOD BY AUTOMATED COUNT: 15 % (ref 11.6–15.1)
ERYTHROCYTE [DISTWIDTH] IN BLOOD BY AUTOMATED COUNT: 15.3 % (ref 11.6–15.1)
EST. AVERAGE GLUCOSE BLD GHB EST-MCNC: 111 MG/DL
FLUAV RNA RESP QL NAA+PROBE: NEGATIVE
FLUBV RNA RESP QL NAA+PROBE: NEGATIVE
GFR SERPL CREATININE-BSD FRML MDRD: 64 ML/MIN/1.73SQ M
GFR SERPL CREATININE-BSD FRML MDRD: 67 ML/MIN/1.73SQ M
GFR SERPL CREATININE-BSD FRML MDRD: 71 ML/MIN/1.73SQ M
GLUCOSE SERPL-MCNC: 101 MG/DL (ref 65–140)
GLUCOSE SERPL-MCNC: 120 MG/DL (ref 65–99)
GLUCOSE SERPL-MCNC: 212 MG/DL (ref 65–140)
GLUCOSE SERPL-MCNC: 90 MG/DL (ref 65–140)
GLUCOSE UR STRIP-MCNC: NEGATIVE MG/DL
HAV IGM SER QL: NORMAL
HBA1C MFR BLD: 5.5 %
HBV CORE IGM SER QL: NORMAL
HBV SURFACE AG SER QL: NORMAL
HCT VFR BLD AUTO: 34.9 % (ref 34.8–46.1)
HCT VFR BLD AUTO: 36.6 % (ref 34.8–46.1)
HCT VFR BLD AUTO: 39.5 % (ref 42–47)
HCV AB SER QL: NORMAL
HDLC SERPL-MCNC: 54 MG/DL
HGB BLD-MCNC: 12 G/DL (ref 11.5–15.4)
HGB BLD-MCNC: 12.5 G/DL (ref 11.5–15.4)
HGB BLD-MCNC: 13.4 G/DL (ref 12–16)
HGB UR QL STRIP.AUTO: ABNORMAL
IMM GRANULOCYTES # BLD AUTO: 0.04 THOUSAND/UL (ref 0–0.2)
IMM GRANULOCYTES # BLD AUTO: 0.13 THOUSAND/UL (ref 0–0.2)
IMM GRANULOCYTES NFR BLD AUTO: 0 % (ref 0–2)
IMM GRANULOCYTES NFR BLD AUTO: 1 % (ref 0–2)
INR PPP: 1.08 (ref 0.84–1.19)
KCT BLD-ACNC: 286 SEC (ref 89–137)
KETONES UR STRIP-MCNC: NEGATIVE MG/DL
LACTATE SERPL-SCNC: 1.1 MMOL/L (ref 0.5–2)
LDLC SERPL CALC-MCNC: 43 MG/DL (ref 0–100)
LEUKOCYTE ESTERASE UR QL STRIP: NEGATIVE
LYMPHOCYTES # BLD AUTO: 1 THOUSANDS/ΜL (ref 0.6–4.47)
LYMPHOCYTES # BLD AUTO: 1.22 THOUSANDS/ΜL (ref 0.6–4.47)
LYMPHOCYTES # BLD AUTO: 1.39 THOUSANDS/ΜL (ref 0.6–4.47)
LYMPHOCYTES NFR BLD AUTO: 11 % (ref 14–44)
LYMPHOCYTES NFR BLD AUTO: 6 % (ref 21–51)
LYMPHOCYTES NFR BLD AUTO: 8 % (ref 14–44)
MAGNESIUM SERPL-MCNC: 2.1 MG/DL (ref 1.6–2.6)
MCH RBC QN AUTO: 31.8 PG (ref 26–34)
MCH RBC QN AUTO: 32.7 PG (ref 26.8–34.3)
MCH RBC QN AUTO: 32.9 PG (ref 26.8–34.3)
MCHC RBC AUTO-ENTMCNC: 33.8 G/DL (ref 31–37)
MCHC RBC AUTO-ENTMCNC: 34.2 G/DL (ref 31.4–37.4)
MCHC RBC AUTO-ENTMCNC: 34.4 G/DL (ref 31.4–37.4)
MCV RBC AUTO: 94 FL (ref 81–99)
MCV RBC AUTO: 95 FL (ref 82–98)
MCV RBC AUTO: 96 FL (ref 82–98)
MONOCYTES # BLD AUTO: 1.4 THOUSAND/ΜL (ref 0.17–1.22)
MONOCYTES # BLD AUTO: 1.8 THOUSAND/ΜL (ref 0.17–1.22)
MONOCYTES # BLD AUTO: 2.1 THOUSAND/ΜL (ref 0.17–1.22)
MONOCYTES NFR BLD AUTO: 11 % (ref 2–12)
MONOCYTES NFR BLD AUTO: 12 % (ref 4–12)
MONOCYTES NFR BLD AUTO: 12 % (ref 4–12)
NEUTROPHILS # BLD AUTO: 13.3 THOUSANDS/ΜL (ref 1.4–6.5)
NEUTROPHILS # BLD AUTO: 13.46 THOUSANDS/ΜL (ref 1.85–7.62)
NEUTROPHILS # BLD AUTO: 8.82 THOUSANDS/ΜL (ref 1.85–7.62)
NEUTS SEG NFR BLD AUTO: 76 % (ref 43–75)
NEUTS SEG NFR BLD AUTO: 79 % (ref 43–75)
NEUTS SEG NFR BLD AUTO: 82 % (ref 42–75)
NITRITE UR QL STRIP: NEGATIVE
NON-SQ EPI CELLS URNS QL MICRO: NORMAL /HPF
NRBC BLD AUTO-RTO: 0 /100 WBCS
NRBC BLD AUTO-RTO: 0 /100 WBCS
P AXIS: 31 DEGREES
P AXIS: 37 DEGREES
P AXIS: 45 DEGREES
P AXIS: 45 DEGREES
P AXIS: 50 DEGREES
P AXIS: 56 DEGREES
P AXIS: 62 DEGREES
PH UR STRIP.AUTO: 7.5 [PH]
PLATELET # BLD AUTO: 179 THOUSANDS/UL (ref 149–390)
PLATELET # BLD AUTO: 183 THOUSANDS/UL (ref 149–390)
PLATELET # BLD AUTO: 223 THOUSANDS/UL (ref 149–390)
PMV BLD AUTO: 10.4 FL (ref 8.9–12.7)
PMV BLD AUTO: 10.7 FL (ref 8.9–12.7)
PMV BLD AUTO: 8.2 FL (ref 8.6–11.7)
POTASSIUM SERPL-SCNC: 3.2 MMOL/L (ref 3.5–5.3)
POTASSIUM SERPL-SCNC: 3.3 MMOL/L (ref 3.5–5.3)
POTASSIUM SERPL-SCNC: 3.6 MMOL/L (ref 3.5–5.5)
PR INTERVAL: 158 MS
PR INTERVAL: 168 MS
PR INTERVAL: 172 MS
PR INTERVAL: 188 MS
PR INTERVAL: 198 MS
PR INTERVAL: 200 MS
PR INTERVAL: 200 MS
PROCALCITONIN SERPL-MCNC: 0.1 NG/ML
PROCALCITONIN SERPL-MCNC: 0.14 NG/ML
PROCALCITONIN SERPL-MCNC: <0.05 NG/ML
PROT SERPL-MCNC: 6.6 G/DL (ref 6.4–8.2)
PROT SERPL-MCNC: 7.1 G/DL (ref 6.4–8.9)
PROT UR STRIP-MCNC: NEGATIVE MG/DL
PROTHROMBIN TIME: 13.9 SECONDS (ref 11.6–14.5)
QRS AXIS: -3 DEGREES
QRS AXIS: -6 DEGREES
QRS AXIS: -6 DEGREES
QRS AXIS: -7 DEGREES
QRS AXIS: -8 DEGREES
QRS AXIS: 3 DEGREES
QRS AXIS: 4 DEGREES
QRS AXIS: 69 DEGREES
QRS AXIS: 77 DEGREES
QRSD INTERVAL: 104 MS
QRSD INTERVAL: 128 MS
QRSD INTERVAL: 132 MS
QRSD INTERVAL: 138 MS
QRSD INTERVAL: 142 MS
QRSD INTERVAL: 142 MS
QRSD INTERVAL: 84 MS
QRSD INTERVAL: 88 MS
QRSD INTERVAL: 94 MS
QT INTERVAL: 332 MS
QT INTERVAL: 344 MS
QT INTERVAL: 346 MS
QT INTERVAL: 346 MS
QT INTERVAL: 350 MS
QT INTERVAL: 356 MS
QT INTERVAL: 356 MS
QT INTERVAL: 370 MS
QT INTERVAL: 400 MS
QTC INTERVAL: 441 MS
QTC INTERVAL: 441 MS
QTC INTERVAL: 461 MS
QTC INTERVAL: 463 MS
QTC INTERVAL: 469 MS
QTC INTERVAL: 472 MS
QTC INTERVAL: 475 MS
QTC INTERVAL: 488 MS
QTC INTERVAL: 489 MS
RBC # BLD AUTO: 3.67 MILLION/UL (ref 3.81–5.12)
RBC # BLD AUTO: 3.8 MILLION/UL (ref 3.81–5.12)
RBC # BLD AUTO: 4.2 MILLION/UL (ref 3.9–5.2)
RBC #/AREA URNS AUTO: NORMAL /HPF
RSV RNA RESP QL NAA+PROBE: NEGATIVE
SARS-COV-2 RNA RESP QL NAA+PROBE: NEGATIVE
SODIUM SERPL-SCNC: 131 MMOL/L (ref 134–143)
SODIUM SERPL-SCNC: 134 MMOL/L (ref 136–145)
SODIUM SERPL-SCNC: 135 MMOL/L (ref 136–145)
SP GR UR STRIP.AUTO: <=1.005 (ref 1–1.03)
SPECIMEN SOURCE: ABNORMAL
T WAVE AXIS: -13 DEGREES
T WAVE AXIS: -29 DEGREES
T WAVE AXIS: -32 DEGREES
T WAVE AXIS: -38 DEGREES
T WAVE AXIS: -44 DEGREES
T WAVE AXIS: -60 DEGREES
T WAVE AXIS: -7 DEGREES
T WAVE AXIS: 3 DEGREES
T WAVE AXIS: 4 DEGREES
TRIGL SERPL-MCNC: 56 MG/DL
TROPONIN I SERPL-MCNC: 41.39 NG/ML
TROPONIN I SERPL-MCNC: 45.53 NG/ML
TROPONIN I SERPL-MCNC: >40 NG/ML
TROPONIN I SERPL-MCNC: >40 NG/ML
UROBILINOGEN UR QL STRIP.AUTO: 0.2 E.U./DL
VENTRICULAR RATE: 106 BPM
VENTRICULAR RATE: 107 BPM
VENTRICULAR RATE: 107 BPM
VENTRICULAR RATE: 108 BPM
VENTRICULAR RATE: 108 BPM
VENTRICULAR RATE: 113 BPM
VENTRICULAR RATE: 90 BPM
VENTRICULAR RATE: 98 BPM
VENTRICULAR RATE: 99 BPM
WBC # BLD AUTO: 11.59 THOUSAND/UL (ref 4.31–10.16)
WBC # BLD AUTO: 16.3 THOUSAND/UL (ref 4.8–10.8)
WBC # BLD AUTO: 17.12 THOUSAND/UL (ref 4.31–10.16)
WBC #/AREA URNS AUTO: NORMAL /HPF

## 2021-01-01 PROCEDURE — 93005 ELECTROCARDIOGRAM TRACING: CPT

## 2021-01-01 PROCEDURE — 99232 SBSQ HOSP IP/OBS MODERATE 35: CPT | Performed by: INTERNAL MEDICINE

## 2021-01-01 PROCEDURE — 84145 PROCALCITONIN (PCT): CPT | Performed by: INTERNAL MEDICINE

## 2021-01-01 PROCEDURE — 87040 BLOOD CULTURE FOR BACTERIA: CPT | Performed by: INTERNAL MEDICINE

## 2021-01-01 PROCEDURE — 96367 TX/PROPH/DG ADDL SEQ IV INF: CPT

## 2021-01-01 PROCEDURE — 93458 L HRT ARTERY/VENTRICLE ANGIO: CPT | Performed by: PHYSICIAN ASSISTANT

## 2021-01-01 PROCEDURE — 84145 PROCALCITONIN (PCT): CPT | Performed by: NURSE PRACTITIONER

## 2021-01-01 PROCEDURE — 5A12012 PERFORMANCE OF CARDIAC OUTPUT, SINGLE, MANUAL: ICD-10-PCS | Performed by: FAMILY MEDICINE

## 2021-01-01 PROCEDURE — 99152 MOD SED SAME PHYS/QHP 5/>YRS: CPT | Performed by: INTERNAL MEDICINE

## 2021-01-01 PROCEDURE — 82948 REAGENT STRIP/BLOOD GLUCOSE: CPT

## 2021-01-01 PROCEDURE — 85730 THROMBOPLASTIN TIME PARTIAL: CPT | Performed by: NURSE PRACTITIONER

## 2021-01-01 PROCEDURE — C1725 CATH, TRANSLUMIN NON-LASER: HCPCS | Performed by: PHYSICIAN ASSISTANT

## 2021-01-01 PROCEDURE — 4A023N7 MEASUREMENT OF CARDIAC SAMPLING AND PRESSURE, LEFT HEART, PERCUTANEOUS APPROACH: ICD-10-PCS | Performed by: INTERNAL MEDICINE

## 2021-01-01 PROCEDURE — 80061 LIPID PANEL: CPT | Performed by: NURSE PRACTITIONER

## 2021-01-01 PROCEDURE — 84484 ASSAY OF TROPONIN QUANT: CPT | Performed by: INTERNAL MEDICINE

## 2021-01-01 PROCEDURE — 93010 ELECTROCARDIOGRAM REPORT: CPT | Performed by: INTERNAL MEDICINE

## 2021-01-01 PROCEDURE — 99223 1ST HOSP IP/OBS HIGH 75: CPT | Performed by: INTERNAL MEDICINE

## 2021-01-01 PROCEDURE — 83036 HEMOGLOBIN GLYCOSYLATED A1C: CPT | Performed by: NURSE PRACTITIONER

## 2021-01-01 PROCEDURE — 85730 THROMBOPLASTIN TIME PARTIAL: CPT | Performed by: FAMILY MEDICINE

## 2021-01-01 PROCEDURE — 84484 ASSAY OF TROPONIN QUANT: CPT | Performed by: NURSE PRACTITIONER

## 2021-01-01 PROCEDURE — 99285 EMERGENCY DEPT VISIT HI MDM: CPT

## 2021-01-01 PROCEDURE — 99233 SBSQ HOSP IP/OBS HIGH 50: CPT | Performed by: INTERNAL MEDICINE

## 2021-01-01 PROCEDURE — 31500 INSERT EMERGENCY AIRWAY: CPT | Performed by: NURSE PRACTITIONER

## 2021-01-01 PROCEDURE — C1760 CLOSURE DEV, VASC: HCPCS | Performed by: PHYSICIAN ASSISTANT

## 2021-01-01 PROCEDURE — B2111ZZ FLUOROSCOPY OF MULTIPLE CORONARY ARTERIES USING LOW OSMOLAR CONTRAST: ICD-10-PCS | Performed by: INTERNAL MEDICINE

## 2021-01-01 PROCEDURE — 99292 CRITICAL CARE ADDL 30 MIN: CPT | Performed by: INTERNAL MEDICINE

## 2021-01-01 PROCEDURE — 36415 COLL VENOUS BLD VENIPUNCTURE: CPT | Performed by: INTERNAL MEDICINE

## 2021-01-01 PROCEDURE — 92978 ENDOLUMINL IVUS OCT C 1ST: CPT | Performed by: INTERNAL MEDICINE

## 2021-01-01 PROCEDURE — C1753 CATH, INTRAVAS ULTRASOUND: HCPCS | Performed by: PHYSICIAN ASSISTANT

## 2021-01-01 PROCEDURE — C1757 CATH, THROMBECTOMY/EMBOLECT: HCPCS | Performed by: PHYSICIAN ASSISTANT

## 2021-01-01 PROCEDURE — C1769 GUIDE WIRE: HCPCS | Performed by: PHYSICIAN ASSISTANT

## 2021-01-01 PROCEDURE — 85025 COMPLETE CBC W/AUTO DIFF WBC: CPT | Performed by: FAMILY MEDICINE

## 2021-01-01 PROCEDURE — 99232 SBSQ HOSP IP/OBS MODERATE 35: CPT | Performed by: FAMILY MEDICINE

## 2021-01-01 PROCEDURE — 81003 URINALYSIS AUTO W/O SCOPE: CPT | Performed by: INTERNAL MEDICINE

## 2021-01-01 PROCEDURE — 85347 COAGULATION TIME ACTIVATED: CPT

## 2021-01-01 PROCEDURE — 96375 TX/PRO/DX INJ NEW DRUG ADDON: CPT

## 2021-01-01 PROCEDURE — 80074 ACUTE HEPATITIS PANEL: CPT | Performed by: NURSE PRACTITIONER

## 2021-01-01 PROCEDURE — 96374 THER/PROPH/DIAG INJ IV PUSH: CPT

## 2021-01-01 PROCEDURE — 74177 CT ABD & PELVIS W/CONTRAST: CPT

## 2021-01-01 PROCEDURE — 85025 COMPLETE CBC W/AUTO DIFF WBC: CPT | Performed by: INTERNAL MEDICINE

## 2021-01-01 PROCEDURE — 80053 COMPREHEN METABOLIC PANEL: CPT | Performed by: INTERNAL MEDICINE

## 2021-01-01 PROCEDURE — 92928 PRQ TCAT PLMT NTRAC ST 1 LES: CPT | Performed by: INTERNAL MEDICINE

## 2021-01-01 PROCEDURE — 81001 URINALYSIS AUTO W/SCOPE: CPT | Performed by: INTERNAL MEDICINE

## 2021-01-01 PROCEDURE — 93005 ELECTROCARDIOGRAM TRACING: CPT | Performed by: NURSE PRACTITIONER

## 2021-01-01 PROCEDURE — 99223 1ST HOSP IP/OBS HIGH 75: CPT | Performed by: NURSE PRACTITIONER

## 2021-01-01 PROCEDURE — 71045 X-RAY EXAM CHEST 1 VIEW: CPT

## 2021-01-01 PROCEDURE — 99152 MOD SED SAME PHYS/QHP 5/>YRS: CPT | Performed by: PHYSICIAN ASSISTANT

## 2021-01-01 PROCEDURE — G1004 CDSM NDSC: HCPCS

## 2021-01-01 PROCEDURE — C1894 INTRO/SHEATH, NON-LASER: HCPCS | Performed by: PHYSICIAN ASSISTANT

## 2021-01-01 PROCEDURE — 80048 BASIC METABOLIC PNL TOTAL CA: CPT | Performed by: FAMILY MEDICINE

## 2021-01-01 PROCEDURE — 0BH18EZ INSERTION OF ENDOTRACHEAL AIRWAY INTO TRACHEA, VIA NATURAL OR ARTIFICIAL OPENING ENDOSCOPIC: ICD-10-PCS | Performed by: FAMILY MEDICINE

## 2021-01-01 PROCEDURE — C9600 PERC DRUG-EL COR STENT SING: HCPCS | Performed by: PHYSICIAN ASSISTANT

## 2021-01-01 PROCEDURE — 83735 ASSAY OF MAGNESIUM: CPT | Performed by: FAMILY MEDICINE

## 2021-01-01 PROCEDURE — 71260 CT THORAX DX C+: CPT

## 2021-01-01 PROCEDURE — 99291 CRITICAL CARE FIRST HOUR: CPT | Performed by: INTERNAL MEDICINE

## 2021-01-01 PROCEDURE — C1874 STENT, COATED/COV W/DEL SYS: HCPCS

## 2021-01-01 PROCEDURE — 80053 COMPREHEN METABOLIC PANEL: CPT | Performed by: NURSE PRACTITIONER

## 2021-01-01 PROCEDURE — S9083 URGENT CARE CENTER GLOBAL: HCPCS | Performed by: NURSE PRACTITIONER

## 2021-01-01 PROCEDURE — 96365 THER/PROPH/DIAG IV INF INIT: CPT

## 2021-01-01 PROCEDURE — 027034Z DILATION OF CORONARY ARTERY, ONE ARTERY WITH DRUG-ELUTING INTRALUMINAL DEVICE, PERCUTANEOUS APPROACH: ICD-10-PCS | Performed by: INTERNAL MEDICINE

## 2021-01-01 PROCEDURE — 85730 THROMBOPLASTIN TIME PARTIAL: CPT | Performed by: INTERNAL MEDICINE

## 2021-01-01 PROCEDURE — 36556 INSERT NON-TUNNEL CV CATH: CPT | Performed by: NURSE PRACTITIONER

## 2021-01-01 PROCEDURE — C1887 CATHETER, GUIDING: HCPCS | Performed by: PHYSICIAN ASSISTANT

## 2021-01-01 PROCEDURE — 0241U HB NFCT DS VIR RESP RNA 4 TRGT: CPT | Performed by: INTERNAL MEDICINE

## 2021-01-01 PROCEDURE — 92978 ENDOLUMINL IVUS OCT C 1ST: CPT | Performed by: PHYSICIAN ASSISTANT

## 2021-01-01 PROCEDURE — 93458 L HRT ARTERY/VENTRICLE ANGIO: CPT | Performed by: INTERNAL MEDICINE

## 2021-01-01 PROCEDURE — 96376 TX/PRO/DX INJ SAME DRUG ADON: CPT

## 2021-01-01 PROCEDURE — 93306 TTE W/DOPPLER COMPLETE: CPT | Performed by: INTERNAL MEDICINE

## 2021-01-01 PROCEDURE — 99213 OFFICE O/P EST LOW 20 MIN: CPT | Performed by: NURSE PRACTITIONER

## 2021-01-01 PROCEDURE — 83605 ASSAY OF LACTIC ACID: CPT | Performed by: INTERNAL MEDICINE

## 2021-01-01 PROCEDURE — NC001 PR NO CHARGE: Performed by: INTERNAL MEDICINE

## 2021-01-01 PROCEDURE — 06HY33Z INSERTION OF INFUSION DEVICE INTO LOWER VEIN, PERCUTANEOUS APPROACH: ICD-10-PCS | Performed by: FAMILY MEDICINE

## 2021-01-01 PROCEDURE — C8929 TTE W OR WO FOL WCON,DOPPLER: HCPCS

## 2021-01-01 PROCEDURE — 96366 THER/PROPH/DIAG IV INF ADDON: CPT

## 2021-01-01 PROCEDURE — 85610 PROTHROMBIN TIME: CPT | Performed by: INTERNAL MEDICINE

## 2021-01-01 PROCEDURE — 85025 COMPLETE CBC W/AUTO DIFF WBC: CPT | Performed by: NURSE PRACTITIONER

## 2021-01-01 RX ORDER — LOSARTAN POTASSIUM AND HYDROCHLOROTHIAZIDE 25; 100 MG/1; MG/1
1 TABLET ORAL DAILY
COMMUNITY

## 2021-01-01 RX ORDER — HEPARIN SODIUM 1000 [USP'U]/ML
4000 INJECTION, SOLUTION INTRAVENOUS; SUBCUTANEOUS ONCE
Status: COMPLETED | OUTPATIENT
Start: 2021-01-01 | End: 2021-01-01

## 2021-01-01 RX ORDER — VALSARTAN AND HYDROCHLOROTHIAZIDE 160; 25 MG/1; MG/1
TABLET ORAL
Status: ON HOLD | COMMUNITY
Start: 2021-01-01 | End: 2021-01-01 | Stop reason: CLARIF

## 2021-01-01 RX ORDER — METOPROLOL SUCCINATE 25 MG/1
25 TABLET, EXTENDED RELEASE ORAL DAILY
Status: DISCONTINUED | OUTPATIENT
Start: 2021-01-01 | End: 2021-01-01

## 2021-01-01 RX ORDER — ASPIRIN 81 MG/1
324 TABLET, CHEWABLE ORAL ONCE
Status: COMPLETED | OUTPATIENT
Start: 2021-01-01 | End: 2021-01-01

## 2021-01-01 RX ORDER — ONDANSETRON 2 MG/ML
4 INJECTION INTRAMUSCULAR; INTRAVENOUS EVERY 6 HOURS PRN
Status: DISCONTINUED | OUTPATIENT
Start: 2021-01-01 | End: 2021-04-17 | Stop reason: HOSPADM

## 2021-01-01 RX ORDER — SODIUM CHLORIDE 9 MG/ML
INJECTION, SOLUTION INTRAVENOUS
Status: COMPLETED | OUTPATIENT
Start: 2021-01-01 | End: 2021-01-01

## 2021-01-01 RX ORDER — METOPROLOL TARTRATE 5 MG/5ML
5 INJECTION INTRAVENOUS ONCE
Status: COMPLETED | OUTPATIENT
Start: 2021-01-01 | End: 2021-01-01

## 2021-01-01 RX ORDER — HYDROMORPHONE HCL IN WATER/PF 6 MG/30 ML
0.2 PATIENT CONTROLLED ANALGESIA SYRINGE INTRAVENOUS EVERY 4 HOURS PRN
Status: DISCONTINUED | OUTPATIENT
Start: 2021-01-01 | End: 2021-04-17 | Stop reason: HOSPADM

## 2021-01-01 RX ORDER — LEVOTHYROXINE SODIUM 0.05 MG/1
50 TABLET ORAL
Status: DISCONTINUED | OUTPATIENT
Start: 2021-01-01 | End: 2021-04-17 | Stop reason: HOSPADM

## 2021-01-01 RX ORDER — ZOLPIDEM TARTRATE 5 MG/1
10 TABLET ORAL
Status: DISCONTINUED | OUTPATIENT
Start: 2021-01-01 | End: 2021-04-17 | Stop reason: HOSPADM

## 2021-01-01 RX ORDER — NITROGLYCERIN 0.4 MG/1
0.4 TABLET SUBLINGUAL
Status: DISCONTINUED | OUTPATIENT
Start: 2021-01-01 | End: 2021-04-17 | Stop reason: HOSPADM

## 2021-01-01 RX ORDER — METOPROLOL SUCCINATE 25 MG/1
25 TABLET, EXTENDED RELEASE ORAL DAILY
Status: DISCONTINUED | OUTPATIENT
Start: 2021-01-01 | End: 2021-04-17 | Stop reason: HOSPADM

## 2021-01-01 RX ORDER — EPINEPHRINE 0.1 MG/ML
SYRINGE (ML) INJECTION CODE/TRAUMA/SEDATION MEDICATION
Status: COMPLETED | OUTPATIENT
Start: 2021-01-01 | End: 2021-01-01

## 2021-01-01 RX ORDER — POTASSIUM CHLORIDE 14.9 MG/ML
20 INJECTION INTRAVENOUS
Status: DISPENSED | OUTPATIENT
Start: 2021-01-01 | End: 2021-01-01

## 2021-01-01 RX ORDER — PRASUGREL 10 MG/1
10 TABLET, FILM COATED ORAL DAILY
Status: DISCONTINUED | OUTPATIENT
Start: 2021-01-01 | End: 2021-04-17 | Stop reason: HOSPADM

## 2021-01-01 RX ORDER — FENTANYL CITRATE 50 UG/ML
INJECTION, SOLUTION INTRAMUSCULAR; INTRAVENOUS CODE/TRAUMA/SEDATION MEDICATION
Status: COMPLETED | OUTPATIENT
Start: 2021-01-01 | End: 2021-01-01

## 2021-01-01 RX ORDER — LEVOTHYROXINE SODIUM 0.05 MG/1
50 TABLET ORAL DAILY
Qty: 90 TABLET | Refills: 0 | Status: SHIPPED | OUTPATIENT
Start: 2021-01-01

## 2021-01-01 RX ORDER — ACETAMINOPHEN 325 MG/1
650 TABLET ORAL EVERY 4 HOURS PRN
Status: DISCONTINUED | OUTPATIENT
Start: 2021-01-01 | End: 2021-04-17 | Stop reason: HOSPADM

## 2021-01-01 RX ORDER — ASPIRIN 81 MG/1
81 TABLET ORAL DAILY
Status: DISCONTINUED | OUTPATIENT
Start: 2021-01-01 | End: 2021-04-17 | Stop reason: HOSPADM

## 2021-01-01 RX ORDER — HEPARIN SODIUM 1000 [USP'U]/ML
INJECTION, SOLUTION INTRAVENOUS; SUBCUTANEOUS CODE/TRAUMA/SEDATION MEDICATION
Status: COMPLETED | OUTPATIENT
Start: 2021-01-01 | End: 2021-01-01

## 2021-01-01 RX ORDER — ATORVASTATIN CALCIUM 40 MG/1
40 TABLET, FILM COATED ORAL
Status: DISCONTINUED | OUTPATIENT
Start: 2021-01-01 | End: 2021-04-17 | Stop reason: HOSPADM

## 2021-01-01 RX ORDER — SODIUM CHLORIDE, SODIUM GLUCONATE, SODIUM ACETATE, POTASSIUM CHLORIDE, MAGNESIUM CHLORIDE, SODIUM PHOSPHATE, DIBASIC, AND POTASSIUM PHOSPHATE .53; .5; .37; .037; .03; .012; .00082 G/100ML; G/100ML; G/100ML; G/100ML; G/100ML; G/100ML; G/100ML
1000 INJECTION, SOLUTION INTRAVENOUS ONCE
Status: COMPLETED | OUTPATIENT
Start: 2021-01-01 | End: 2021-01-01

## 2021-01-01 RX ORDER — POTASSIUM CHLORIDE 20 MEQ/1
40 TABLET, EXTENDED RELEASE ORAL ONCE
Status: DISCONTINUED | OUTPATIENT
Start: 2021-01-01 | End: 2021-01-01

## 2021-01-01 RX ORDER — AMIODARONE HYDROCHLORIDE 200 MG/1
200 TABLET ORAL 2 TIMES DAILY WITH MEALS
Status: DISCONTINUED | OUTPATIENT
Start: 2021-04-17 | End: 2021-04-17 | Stop reason: HOSPADM

## 2021-01-01 RX ORDER — CLOPIDOGREL BISULFATE 75 MG/1
300 TABLET ORAL ONCE
Status: COMPLETED | OUTPATIENT
Start: 2021-01-01 | End: 2021-01-01

## 2021-01-01 RX ORDER — OXYCODONE HYDROCHLORIDE 5 MG/1
2.5 TABLET ORAL EVERY 4 HOURS PRN
Status: DISCONTINUED | OUTPATIENT
Start: 2021-01-01 | End: 2021-04-17 | Stop reason: HOSPADM

## 2021-01-01 RX ORDER — PRASUGREL 10 MG/1
10 TABLET, FILM COATED ORAL DAILY
Qty: 30 TABLET | Refills: 0 | Status: SHIPPED | OUTPATIENT
Start: 2021-01-01

## 2021-01-01 RX ORDER — HEPARIN SODIUM 10000 [USP'U]/100ML
3-20 INJECTION, SOLUTION INTRAVENOUS
Status: DISCONTINUED | OUTPATIENT
Start: 2021-01-01 | End: 2021-01-01

## 2021-01-01 RX ORDER — CLOPIDOGREL BISULFATE 75 MG/1
75 TABLET ORAL DAILY
Status: DISCONTINUED | OUTPATIENT
Start: 2021-01-01 | End: 2021-01-01

## 2021-01-01 RX ORDER — HEPARIN SODIUM 1000 [USP'U]/ML
2000 INJECTION, SOLUTION INTRAVENOUS; SUBCUTANEOUS
Status: DISCONTINUED | OUTPATIENT
Start: 2021-01-01 | End: 2021-01-01 | Stop reason: HOSPADM

## 2021-01-01 RX ORDER — NITROGLYCERIN 20 MG/100ML
INJECTION INTRAVENOUS CODE/TRAUMA/SEDATION MEDICATION
Status: COMPLETED | OUTPATIENT
Start: 2021-01-01 | End: 2021-01-01

## 2021-01-01 RX ORDER — METOPROLOL SUCCINATE 50 MG/1
50 TABLET, EXTENDED RELEASE ORAL DAILY
Status: DISCONTINUED | OUTPATIENT
Start: 2021-01-01 | End: 2021-01-01

## 2021-01-01 RX ORDER — HEPARIN SODIUM 1000 [USP'U]/ML
2000 INJECTION, SOLUTION INTRAVENOUS; SUBCUTANEOUS
Status: DISCONTINUED | OUTPATIENT
Start: 2021-01-01 | End: 2021-01-01

## 2021-01-01 RX ORDER — AMIODARONE HYDROCHLORIDE 200 MG/1
400 TABLET ORAL ONCE
Status: COMPLETED | OUTPATIENT
Start: 2021-01-01 | End: 2021-01-01

## 2021-01-01 RX ORDER — HEPARIN SODIUM 1000 [USP'U]/ML
4000 INJECTION, SOLUTION INTRAVENOUS; SUBCUTANEOUS
Status: DISCONTINUED | OUTPATIENT
Start: 2021-01-01 | End: 2021-01-01 | Stop reason: HOSPADM

## 2021-01-01 RX ORDER — LEVOTHYROXINE SODIUM 0.05 MG/1
50 TABLET ORAL
Status: DISCONTINUED | OUTPATIENT
Start: 2021-01-01 | End: 2021-01-01

## 2021-01-01 RX ORDER — SODIUM CHLORIDE 9 MG/ML
150 INJECTION, SOLUTION INTRAVENOUS CONTINUOUS
Status: DISPENSED | OUTPATIENT
Start: 2021-01-01 | End: 2021-01-01

## 2021-01-01 RX ORDER — SODIUM BICARBONATE 84 MG/ML
INJECTION, SOLUTION INTRAVENOUS CODE/TRAUMA/SEDATION MEDICATION
Status: COMPLETED | OUTPATIENT
Start: 2021-01-01 | End: 2021-01-01

## 2021-01-01 RX ORDER — HEPARIN SODIUM 10000 [USP'U]/100ML
3-20 INJECTION, SOLUTION INTRAVENOUS
Status: DISCONTINUED | OUTPATIENT
Start: 2021-01-01 | End: 2021-01-01 | Stop reason: HOSPADM

## 2021-01-01 RX ORDER — AMIODARONE HYDROCHLORIDE 50 MG/ML
INJECTION, SOLUTION INTRAVENOUS CODE/TRAUMA/SEDATION MEDICATION
Status: COMPLETED | OUTPATIENT
Start: 2021-01-01 | End: 2021-01-01

## 2021-01-01 RX ORDER — CALCIUM CHLORIDE 100 MG/ML
SYRINGE (ML) INTRAVENOUS CODE/TRAUMA/SEDATION MEDICATION
Status: COMPLETED | OUTPATIENT
Start: 2021-01-01 | End: 2021-01-01

## 2021-01-01 RX ORDER — MAGNESIUM HYDROXIDE/ALUMINUM HYDROXICE/SIMETHICONE 120; 1200; 1200 MG/30ML; MG/30ML; MG/30ML
30 SUSPENSION ORAL EVERY 6 HOURS PRN
Status: DISCONTINUED | OUTPATIENT
Start: 2021-01-01 | End: 2021-04-17 | Stop reason: HOSPADM

## 2021-01-01 RX ORDER — ASPIRIN 81 MG/1
81 TABLET, CHEWABLE ORAL DAILY
Status: DISCONTINUED | OUTPATIENT
Start: 2021-01-01 | End: 2021-01-01

## 2021-01-01 RX ORDER — LEVOTHYROXINE SODIUM 0.03 MG/1
25 TABLET ORAL
Status: DISCONTINUED | OUTPATIENT
Start: 2021-01-01 | End: 2021-01-01

## 2021-01-01 RX ORDER — LIDOCAINE HYDROCHLORIDE 10 MG/ML
INJECTION, SOLUTION EPIDURAL; INFILTRATION; INTRACAUDAL; PERINEURAL CODE/TRAUMA/SEDATION MEDICATION
Status: COMPLETED | OUTPATIENT
Start: 2021-01-01 | End: 2021-01-01

## 2021-01-01 RX ORDER — POTASSIUM CHLORIDE 29.8 MG/ML
40 INJECTION INTRAVENOUS ONCE
Status: DISCONTINUED | OUTPATIENT
Start: 2021-01-01 | End: 2021-01-01 | Stop reason: DRUGHIGH

## 2021-01-01 RX ORDER — BARICITINIB 2 MG/1
2 TABLET, FILM COATED ORAL
COMMUNITY

## 2021-01-01 RX ORDER — LOSARTAN POTASSIUM 50 MG/1
100 TABLET ORAL DAILY
Status: DISCONTINUED | OUTPATIENT
Start: 2021-01-01 | End: 2021-04-17 | Stop reason: HOSPADM

## 2021-01-01 RX ORDER — MIDAZOLAM HYDROCHLORIDE 2 MG/2ML
INJECTION, SOLUTION INTRAMUSCULAR; INTRAVENOUS CODE/TRAUMA/SEDATION MEDICATION
Status: COMPLETED | OUTPATIENT
Start: 2021-01-01 | End: 2021-01-01

## 2021-01-01 RX ORDER — HEPARIN SODIUM 1000 [USP'U]/ML
4000 INJECTION, SOLUTION INTRAVENOUS; SUBCUTANEOUS
Status: DISCONTINUED | OUTPATIENT
Start: 2021-01-01 | End: 2021-01-01

## 2021-01-01 RX ORDER — HEPARIN SODIUM 5000 [USP'U]/ML
5000 INJECTION, SOLUTION INTRAVENOUS; SUBCUTANEOUS EVERY 8 HOURS SCHEDULED
Status: DISCONTINUED | OUTPATIENT
Start: 2021-01-01 | End: 2021-01-01

## 2021-01-01 RX ADMIN — SODIUM CHLORIDE 999 ML/HR: 0.9 INJECTION, SOLUTION INTRAVENOUS at 10:19

## 2021-01-01 RX ADMIN — MIDAZOLAM 1 MG: 1 INJECTION INTRAMUSCULAR; INTRAVENOUS at 10:06

## 2021-01-01 RX ADMIN — MIDAZOLAM 2 MG: 1 INJECTION INTRAMUSCULAR; INTRAVENOUS at 09:56

## 2021-01-01 RX ADMIN — EPINEPHRINE 1 MG: 0.1 INJECTION, SOLUTION ENDOTRACHEAL; INTRACARDIAC; INTRAVENOUS at 19:10

## 2021-01-01 RX ADMIN — FENTANYL CITRATE 50 MCG: 50 INJECTION INTRAMUSCULAR; INTRAVENOUS at 10:06

## 2021-01-01 RX ADMIN — EPINEPHRINE 1 MG: 0.1 INJECTION, SOLUTION ENDOTRACHEAL; INTRACARDIAC; INTRAVENOUS at 18:55

## 2021-01-01 RX ADMIN — HEPARIN SODIUM 4000 UNITS: 1000 INJECTION INTRAVENOUS; SUBCUTANEOUS at 04:00

## 2021-01-01 RX ADMIN — EPINEPHRINE 1 MG: 0.1 INJECTION, SOLUTION ENDOTRACHEAL; INTRACARDIAC; INTRAVENOUS at 19:03

## 2021-01-01 RX ADMIN — SODIUM CHLORIDE, SODIUM GLUCONATE, SODIUM ACETATE, POTASSIUM CHLORIDE, MAGNESIUM CHLORIDE, SODIUM PHOSPHATE, DIBASIC, AND POTASSIUM PHOSPHATE 1000 ML: .53; .5; .37; .037; .03; .012; .00082 INJECTION, SOLUTION INTRAVENOUS at 15:42

## 2021-01-01 RX ADMIN — HEPARIN SODIUM 2000 UNITS: 1000 INJECTION INTRAVENOUS; SUBCUTANEOUS at 11:22

## 2021-01-01 RX ADMIN — NITROGLYCERIN 200 MCG: 20 INJECTION INTRAVENOUS at 10:45

## 2021-01-01 RX ADMIN — SODIUM BICARBONATE 50 MEQ: 84 INJECTION, SOLUTION INTRAVENOUS at 18:59

## 2021-01-01 RX ADMIN — PERFLUTREN 0.4 ML/MIN: 6.52 INJECTION, SUSPENSION INTRAVENOUS at 15:24

## 2021-01-01 RX ADMIN — HEPARIN SODIUM 12 UNITS/KG/HR: 10000 INJECTION, SOLUTION INTRAVENOUS at 18:36

## 2021-01-01 RX ADMIN — EPINEPHRINE 1 MG: 0.1 INJECTION, SOLUTION ENDOTRACHEAL; INTRACARDIAC; INTRAVENOUS at 19:06

## 2021-01-01 RX ADMIN — FENTANYL CITRATE 50 MCG: 50 INJECTION INTRAMUSCULAR; INTRAVENOUS at 09:56

## 2021-01-01 RX ADMIN — CLOPIDOGREL BISULFATE 75 MG: 75 TABLET ORAL at 08:34

## 2021-01-01 RX ADMIN — SODIUM BICARBONATE 50 MEQ: 84 INJECTION, SOLUTION INTRAVENOUS at 19:04

## 2021-01-01 RX ADMIN — AMIODARONE HYDROCHLORIDE 400 MG: 200 TABLET ORAL at 15:23

## 2021-01-01 RX ADMIN — HEPARIN SODIUM 12 UNITS/KG/HR: 10000 INJECTION, SOLUTION INTRAVENOUS at 22:00

## 2021-01-01 RX ADMIN — CLOPIDOGREL BISULFATE 75 MG: 75 TABLET ORAL at 08:38

## 2021-01-01 RX ADMIN — LEVOTHYROXINE SODIUM 50 MCG: 50 TABLET ORAL at 05:40

## 2021-01-01 RX ADMIN — EPINEPHRINE 1 MG: 0.1 INJECTION, SOLUTION ENDOTRACHEAL; INTRACARDIAC; INTRAVENOUS at 18:57

## 2021-01-01 RX ADMIN — EPINEPHRINE 1 MG: 0.1 INJECTION, SOLUTION ENDOTRACHEAL; INTRACARDIAC; INTRAVENOUS at 18:53

## 2021-01-01 RX ADMIN — ATORVASTATIN CALCIUM 40 MG: 40 TABLET, FILM COATED ORAL at 16:59

## 2021-01-01 RX ADMIN — HEPARIN SODIUM 2000 UNITS: 1000 INJECTION INTRAVENOUS; SUBCUTANEOUS at 10:22

## 2021-01-01 RX ADMIN — METOPROLOL TARTRATE 5 MG: 5 INJECTION INTRAVENOUS at 19:10

## 2021-01-01 RX ADMIN — ASPIRIN 324 MG: 81 TABLET, CHEWABLE ORAL at 14:07

## 2021-01-01 RX ADMIN — SODIUM BICARBONATE 50 MEQ: 84 INJECTION, SOLUTION INTRAVENOUS at 19:08

## 2021-01-01 RX ADMIN — ATORVASTATIN CALCIUM 40 MG: 40 TABLET, FILM COATED ORAL at 15:42

## 2021-01-01 RX ADMIN — NITROGLYCERIN 1 INCH: 20 OINTMENT TOPICAL at 12:48

## 2021-01-01 RX ADMIN — AMIODARONE HYDROCHLORIDE 150 MG: 50 INJECTION, SOLUTION INTRAVENOUS at 19:02

## 2021-01-01 RX ADMIN — LIDOCAINE HYDROCHLORIDE 10 ML: 10 INJECTION, SOLUTION EPIDURAL; INFILTRATION; INTRACAUDAL; PERINEURAL at 10:03

## 2021-01-01 RX ADMIN — ASPIRIN 81 MG: 81 TABLET ORAL at 08:38

## 2021-01-01 RX ADMIN — CALCIUM CHLORIDE 1 G: 100 INJECTION PARENTERAL at 19:07

## 2021-01-01 RX ADMIN — METOPROLOL SUCCINATE 25 MG: 25 TABLET, EXTENDED RELEASE ORAL at 08:38

## 2021-01-01 RX ADMIN — SODIUM BICARBONATE 50 MEQ: 84 INJECTION, SOLUTION INTRAVENOUS at 18:55

## 2021-01-01 RX ADMIN — EPINEPHRINE 1 MG: 0.1 INJECTION, SOLUTION ENDOTRACHEAL; INTRACARDIAC; INTRAVENOUS at 19:09

## 2021-01-01 RX ADMIN — IOHEXOL 100 ML: 350 INJECTION, SOLUTION INTRAVENOUS at 20:03

## 2021-01-01 RX ADMIN — FENTANYL CITRATE 50 MCG: 50 INJECTION INTRAMUSCULAR; INTRAVENOUS at 10:31

## 2021-01-01 RX ADMIN — POTASSIUM CHLORIDE 20 MEQ: 14.9 INJECTION, SOLUTION INTRAVENOUS at 08:42

## 2021-01-01 RX ADMIN — HEPARIN SODIUM 8000 UNITS: 1000 INJECTION INTRAVENOUS; SUBCUTANEOUS at 10:14

## 2021-01-01 RX ADMIN — HEPARIN SODIUM 14 UNITS/KG/HR: 10000 INJECTION, SOLUTION INTRAVENOUS at 16:11

## 2021-01-01 RX ADMIN — PRASUGREL HYDROCHLORIDE 10 MG: 10 TABLET, FILM COATED ORAL at 13:15

## 2021-01-01 RX ADMIN — EPINEPHRINE 1 MG: 0.1 INJECTION, SOLUTION ENDOTRACHEAL; INTRACARDIAC; INTRAVENOUS at 19:00

## 2021-01-01 RX ADMIN — LEVOTHYROXINE SODIUM 50 MCG: 50 TABLET ORAL at 05:56

## 2021-01-01 RX ADMIN — METOPROLOL SUCCINATE 25 MG: 25 TABLET, EXTENDED RELEASE ORAL at 08:34

## 2021-01-01 RX ADMIN — CLOPIDOGREL BISULFATE 300 MG: 75 TABLET ORAL at 18:31

## 2021-01-01 RX ADMIN — IOHEXOL 110 ML: 350 INJECTION, SOLUTION INTRAVENOUS at 10:51

## 2021-01-01 RX ADMIN — IOHEXOL 50 ML: 240 INJECTION, SOLUTION INTRATHECAL; INTRAVASCULAR; INTRAVENOUS; ORAL at 20:03

## 2021-01-01 RX ADMIN — ASPIRIN 81 MG: 81 TABLET ORAL at 08:34

## 2021-01-01 RX ADMIN — SODIUM CHLORIDE, SODIUM GLUCONATE, SODIUM ACETATE, POTASSIUM CHLORIDE, MAGNESIUM CHLORIDE, SODIUM PHOSPHATE, DIBASIC, AND POTASSIUM PHOSPHATE 1000 ML: .53; .5; .37; .037; .03; .012; .00082 INJECTION, SOLUTION INTRAVENOUS at 15:05

## 2021-01-01 RX ADMIN — SODIUM CHLORIDE 100 ML/HR: 0.9 INJECTION, SOLUTION INTRAVENOUS at 09:56

## 2021-01-01 RX ADMIN — NITROGLYCERIN 200 MCG: 20 INJECTION INTRAVENOUS at 10:28

## 2021-01-01 RX ADMIN — MIDAZOLAM 1 MG: 1 INJECTION INTRAMUSCULAR; INTRAVENOUS at 10:31

## 2021-01-01 RX ADMIN — LOSARTAN POTASSIUM 100 MG: 50 TABLET, FILM COATED ORAL at 08:38

## 2021-01-01 RX ADMIN — METOPROLOL TARTRATE 5 MG: 5 INJECTION INTRAVENOUS at 18:33

## 2021-01-01 RX ADMIN — SODIUM CHLORIDE 150 ML/HR: 0.9 INJECTION, SOLUTION INTRAVENOUS at 11:46

## 2021-01-01 RX ADMIN — HEPARIN SODIUM 4000 UNITS: 1000 INJECTION, SOLUTION INTRAVENOUS; SUBCUTANEOUS at 18:34

## 2021-01-01 RX ADMIN — LOSARTAN POTASSIUM 100 MG: 50 TABLET, FILM COATED ORAL at 08:35

## 2021-01-01 RX ADMIN — FENTANYL CITRATE 50 MCG: 50 INJECTION INTRAMUSCULAR; INTRAVENOUS at 10:49

## 2021-01-14 NOTE — TELEPHONE ENCOUNTER
Third attempt to contact patient in regards to scheduling awv  Left message for patient to call back to schedule

## 2021-01-18 NOTE — TELEPHONE ENCOUNTER
Please have patient schedule visit before I will refill the medication  Also needs AWV and did not return our calls previously

## 2021-01-20 NOTE — TELEPHONE ENCOUNTER
Attempted to contact patient  No answer  Left message for patient to give the office a call back when she can

## 2021-04-14 PROBLEM — R65.10 SIRS (SYSTEMIC INFLAMMATORY RESPONSE SYNDROME) (HCC): Status: ACTIVE | Noted: 2021-01-01

## 2021-04-14 PROBLEM — I25.10 CAD (CORONARY ARTERY DISEASE): Status: RESOLVED | Noted: 2018-07-28 | Resolved: 2021-01-01

## 2021-04-14 PROBLEM — I25.119 CORONARY ARTERY DISEASE INVOLVING NATIVE CORONARY ARTERY OF NATIVE HEART WITH ANGINA PECTORIS (HCC): Chronic | Status: ACTIVE | Noted: 2018-07-28

## 2021-04-14 PROBLEM — E87.1 HYPONATREMIA: Status: ACTIVE | Noted: 2021-01-01

## 2021-04-14 PROBLEM — R74.01 ELEVATED AST (SGOT): Status: ACTIVE | Noted: 2021-01-01

## 2021-04-14 PROBLEM — I21.4 NSTEMI (NON-ST ELEVATED MYOCARDIAL INFARCTION) (HCC): Status: ACTIVE | Noted: 2021-01-01

## 2021-04-14 PROBLEM — F17.210 CIGARETTE SMOKER: Chronic | Status: ACTIVE | Noted: 2021-01-01

## 2021-04-14 NOTE — PATIENT INSTRUCTIONS
EKG does look slightly abnormal compared to previous EKG  There is a possible ST and T-wave abnormality  Considering patient has a significant cardiac history will send to the ER for further evaluation  EMS was called  325 aspirin was given prior to going

## 2021-04-14 NOTE — PROGRESS NOTES
[address] [facility]        NAME: Bobby Harris is a 66 y o  female  : 1945    MRN: [id number]  DATE: 2021  TIME: 2:23 PM    Assessment and Plan   Generalized body aches [R52]  1  Generalized body aches  CANCELED: Multiplex COVID19, Influenza A/B, RSV PCR, [facility] - Office Collection   2  Chest pressure  aspirin chewable tablet 324 mg    DISCONTINUED: aspirin chewable tablet 81 mg         Patient Instructions     Patient Instructions    EKG does look slightly abnormal compared to previous EKG  There is a possible ST and T-wave abnormality  Considering patient has a significant cardiac history will send to the ER for further evaluation  EMS was called  325 aspirin was given prior to going  Chief Complaint     Chief Complaint   Patient presents with    Generalized Body Aches     body aches started yesterday  History of Present Illness   Bobby Harris presents to the clinic c/o     This is a 64-year-old female here today with complaints of generalized body aches  She states generalized body ache started yesterday  She notes she has also been having chest pressure  She states when she lays down and legs on her size she is having pressure that starts in her above upper chest and radiates down into her abdomen  She states she had this pain on last night  She is not currently having this pain  She denies any shortness of breath  But appear short of breath in exam room     She denies any cough or congestion  She denies any known exposure to the COVID or anyone else has been sick  She does have a significant history of having MIs  Granddaughter states she has had 5 MIs in the past   She states only symptoms when she has had MIs in the past were ear pain  Granddaughter notes that she was sleeping all day yesterday last night and this morning  Review of Systems   Review of Systems   Constitutional: Positive for fatigue   Negative for activity change, chills and fever    HENT: Negative for congestion, rhinorrhea, sinus pressure, sinus pain and sneezing  Cardiovascular: Positive for chest pain  Negative for palpitations  Gastrointestinal: Negative for abdominal pain, diarrhea, nausea and vomiting  Genitourinary: Positive for frequency  Negative for dysuria and urgency  Skin: Negative  Neurological: Negative  Psychiatric/Behavioral: Negative            Current Medications     Long-Term Medications   Medication Sig Dispense Refill    aspirin (ECOTRIN LOW STRENGTH) 81 mg EC tablet Take 81 mg by mouth daily      levothyroxine 50 mcg tablet Take 1 tablet (50 mcg total) by mouth daily 90 tablet 0    lisinopril-hydrochlorothiazide (PRINZIDE,ZESTORETIC) 20-12 5 MG per tablet TAKE ONE TABLET BY MOUTH ONCE DAILY 90 tablet 0    methotrexate 2 5 MG tablet Take 2 5 mg by mouth As directed      metoprolol succinate (TOPROL-XL) 50 mg 24 hr tablet Take 1 tablet (50 mg total) by mouth daily 90 tablet 1    metoprolol succinate (TOPROL-XL) 50 mg 24 hr tablet Take 0 5 tablets (25 mg total) by mouth daily (Patient not taking: Reported on 4/13/2021) 90 tablet 2    nitroglycerin (NITROSTAT) 0 4 mg SL tablet Place 1 tablet (0 4 mg total) under the tongue every 5 (five) minutes as needed for chest pain 30 tablet 3    pramipexole (MIRAPEX) 1 mg tablet Take 1 tablet (1 mg total) by mouth daily at bedtime (Patient not taking: Reported on 4/13/2021) 30 tablet 2    simvastatin (ZOCOR) 80 mg tablet Take 1 tablet (80 mg total) by mouth daily 90 tablet 2    valsartan-hydrochlorothiazide (DIOVAN-HCT) 160-25 MG per tablet       zolpidem (AMBIEN) 10 mg tablet Take 1 tablet (10 mg total) by mouth daily at bedtime as needed for sleep 30 tablet 0       Current Allergies     Allergies as of 04/14/2021 - Reviewed 04/14/2021   Allergen Reaction Noted    Clarithromycin Other (See Comments) 08/26/2011    Penicillins Other (See Comments) 08/26/2011            The following portions of the patient's history were reviewed and updated as appropriate: allergies, current medications, past family history, past medical history, past social history, past surgical history and problem list     Objective   /78   Pulse 102   Temp 99 3 °F (37 4 °C)   Resp 20   Ht 5' 5" (1 651 m)   Wt 81 6 kg (180 lb)   SpO2 95%   BMI 29 95 kg/m²        Physical Exam     Physical Exam  Vitals signs and nursing note reviewed  Constitutional:       General: She is not in acute distress  Appearance: Normal appearance  She is ill-appearing  She is not toxic-appearing  HENT:      Right Ear: Tympanic membrane normal       Left Ear: Tympanic membrane normal       Nose: No congestion  Cardiovascular:      Rate and Rhythm: Tachycardia present  Pulmonary:      Effort: Pulmonary effort is normal  No respiratory distress  Breath sounds: Normal breath sounds  No wheezing or rales  Neurological:      Mental Status: She is alert and oriented to person, place, and time  Psychiatric:         Mood and Affect: Mood normal          Behavior: Behavior normal          Thought Content:  Thought content normal          Judgment: Judgment normal          Reviewed EKG with Dr Ford Brown in office

## 2021-04-14 NOTE — EMTALA/ACUTE CARE TRANSFER
[facility]  [address] [id number] [phone]  Dept: [phone]      EMTALA TRANSFER CONSENT    NAME Sarah Scruggs                                         1945                              MRN [id number]    I have been informed of my rights regarding examination, treatment, and transfer   by Dr Edward Myers DO    Benefits: Specialized equipment and/or services available at the receiving facility (Include comment)________________________(Possible cardiac catheterization)    Risks: Potential for delay in receiving treatment, Potential deterioration of medical condition, Loss of IV, Increased discomfort during transfer      Consent for Transfer:  I acknowledge that my medical condition has been evaluated and explained to me by the emergency department physician or other qualified medical person and/or my attending physician, who has recommended that I be transferred to the service of  Accepting Physician: Dr Diego Alonso at [address] Name, [address] : [facility]  The above potential benefits of such transfer, the potential risks associated with such transfer, and the probable risks of not being transferred have been explained to me, and I fully understand them  The doctor has explained that, in my case, the benefits of transfer outweigh the risks  I agree to be transferred  I authorize the performance of emergency medical procedures and treatments upon me in both transit and upon arrival at the receiving facility  Additionally, I authorize the release of any and all medical records to the receiving facility and request they be transported with me, if possible  I understand that the safest mode of transportation during a medical emergency is an ambulance and that the Hospital advocates the use of this mode of transport   Risks of traveling to the receiving facility by car, including absence of medical control, life sustaining equipment, such as oxygen, and medical personnel has been explained to me and I fully understand them  (SIMA CORRECT BOX BELOW)  [  ]  I consent to the stated transfer and to be transported by ambulance/helicopter  [  ]  I consent to the stated transfer, but refuse transportation by ambulance and accept full responsibility for my transportation by car  I understand the risks of non-ambulance transfers and I exonerate the Hospital and its staff from any deterioration in my condition that results from this refusal     X___________________________________________    DATE  21  TIME________  Signature of patient or legally responsible individual signing on patient behalf           RELATIONSHIP TO PATIENT_________________________          Provider Certification    NAME Gaby Geronimo                                         1945                              MRN [id number]    A medical screening exam was performed on the above named patient  Based on the examination:    Condition Necessitating Transfer The encounter diagnosis was Non-ST elevated myocardial infarction (non-STEMI) ([facility] [address] )      Patient Condition: The patient has been stabilized such that within reasonable medical probability, no material deterioration of the patient condition or the condition of the unborn child(kacy) is likely to result from the transfer    Reason for Transfer: Level of Care needed not available at this facility, No bed available at level of patient's needs    Transfer Requirements: [address]   · Space available and qualified personnel available for treatment as acknowledged by Con Contreras  · Agreed to accept transfer and to provide appropriate medical treatment as acknowledged by       Dr Jan Blunt  · Appropriate medical records of the examination and treatment of the patient are provided at the time of transfer   [address] _______  · Transfer will be performed by qualified personnel from    and appropriate transfer equipment as required, including the use of necessary and appropriate life support measures  Provider Certification: I have examined the patient and explained the following risks and benefits of being transferred/refusing transfer to the patient/family:  General risk, such as traffic hazards, adverse weather conditions, rough terrain or turbulence, possible failure of equipment (including vehicle or aircraft), or consequences of actions of persons outside the control of the transport personnel, Unanticipated needs of medical equipment and personnel during transport, Risk of worsening condition, The possibility of a transport vehicle being unavailable      Based on these reasonable risks and benefits to the patient and/or the unborn child(kacy), and based upon the information available at the time of the patients examination, I certify that the medical benefits reasonably to be expected from the provision of appropriate medical treatments at another medical facility outweigh the increasing risks, if any, to the individuals medical condition, and in the case of labor to the unborn child, from effecting the transfer      X____________________________________________ DATE 04/14/21        TIME_______      ORIGINAL - SEND TO MEDICAL RECORDS   COPY - SEND WITH PATIENT DURING TRANSFER

## 2021-04-14 NOTE — ED PROVIDER NOTES
History  Chief Complaint   Patient presents with    Generalized Body Aches     This 68-year-old extensive cardiac history as smoker presents generalized weakness  She states her symptoms started last night  Had difficulty sleeping because of the weakness and fatigue she states but she denied chest pain during that time period  She denies shortness of breath, maybe a mild cough but she is a smoker  Has frequency of urination  She has had no shaking chills, just generalized muscle aches  She states she is 5 stents  She poor historian at best   She has a caregiver Ron robertson, but denies any COVID contact  No other travel injury  She denies chest pain, or any other unusual anginal symptoms  Her temperature notably 100 5  In looking through her chart review, she did not mention that was she was seen today  She at this appointment, parent least stated she had some chest pressure which she denied on arrival here that she had any over the last 2 days  There they gave her full-strength aspirin and sent her here  She continues to deny any chest pain here or pressure  Prior to Admission Medications   Prescriptions Last Dose Informant Patient Reported?  Taking?   aspirin (ECOTRIN LOW STRENGTH) 81 mg EC tablet 4/14/2021 at Unknown time Self Yes Yes   Sig: Take 81 mg by mouth daily   levothyroxine 50 mcg tablet 4/14/2021 at Unknown time Self No Yes   Sig: Take 1 tablet (50 mcg total) by mouth daily   lisinopril-hydrochlorothiazide (PRINZIDE,ZESTORETIC) 20-12 5 MG per tablet 4/14/2021 at Unknown time Self No Yes   Sig: TAKE ONE TABLET BY MOUTH ONCE DAILY   methotrexate 2 5 MG tablet Past Week at Unknown time Self Yes Yes   Sig: Take 2 5 mg by mouth As directed   metoprolol succinate (TOPROL-XL) 50 mg 24 hr tablet 4/13/2021 at Unknown time Self No Yes   Sig: Take 1 tablet (50 mg total) by mouth daily   metoprolol succinate (TOPROL-XL) 50 mg 24 hr tablet  Self No No   Sig: Take 0 5 tablets (25 mg total) by mouth daily   Patient not taking: Reported on 4/13/2021   nitroglycerin (NITROSTAT) 0 4 mg SL tablet  Self No No   Sig: Place 1 tablet (0 4 mg total) under the tongue every 5 (five) minutes as needed for chest pain   pramipexole (MIRAPEX) 1 mg tablet  Self No No   Sig: Take 1 tablet (1 mg total) by mouth daily at bedtime   Patient not taking: Reported on 4/13/2021   simvastatin (ZOCOR) 80 mg tablet 4/13/2021 at Unknown time Self No Yes   Sig: Take 1 tablet (80 mg total) by mouth daily   traMADol (ULTRAM) 50 mg tablet  Self No No   Sig: Take 1 tablet (50 mg total) by mouth daily   Patient not taking: Reported on 4/13/2021   valsartan-hydrochlorothiazide (DIOVAN-HCT) 160-25 MG per tablet 4/13/2021 at Unknown time  Yes Yes   zolpidem (AMBIEN) 10 mg tablet More than a month at Unknown time Self No No   Sig: Take 1 tablet (10 mg total) by mouth daily at bedtime as needed for sleep      Facility-Administered Medications Last Administration Doses Remaining   aspirin chewable tablet 324 mg 4/14/2021  2:07 PM 0          Past Medical History:   Diagnosis Date    Atherosclerosis of CABG, unsp, w oth angina pectoris ([facility] [address]     Cellulitis     Essential hypertension     Hypothyroidism     Myocardial infarction ([facility] [address] [facility]     Rheumatoid arthritis ([facility] [address] )        Past Surgical History:   Procedure Laterality Date    CYSTOSCOPY      HYSTERECTOMY      total    OTHER SURGICAL HISTORY      heart stent    TONSILLECTOMY         Family History   Problem Relation Age of Onset    Heart attack Mother     Heart attack Brother     Heart attack Brother      I have reviewed and agree with the history as documented      E-Cigarette/Vaping    E-Cigarette Use Never User      E-Cigarette/Vaping Substances    Nicotine No     THC No     CBD No     Flavoring No     Other No     Unknown No      Social History     Tobacco Use    Smoking status: Heavy Tobacco Smoker     Packs/day: 1 00     Types: Cigarettes    Smokeless tobacco: Never Used   [facility] Tobacco comment: compulsive smoker-wont quit   Substance Use Topics    Alcohol use: Yes     Frequency: Monthly or less     Comment: rarely    Drug use: No       Review of Systems   Constitutional: Positive for fatigue  Negative for chills and fever  Generalized weakness   HENT: Negative for rhinorrhea and sore throat  Eyes: Negative for visual disturbance  Respiratory: Positive for cough  Negative for shortness of breath  Cardiovascular: Negative for chest pain and leg swelling  Gastrointestinal: Negative for abdominal pain, diarrhea, nausea and vomiting  Genitourinary: Negative for dysuria  Musculoskeletal: Negative for back pain and myalgias  Skin: Negative for rash  Neurological: Negative for dizziness and headaches  Psychiatric/Behavioral: Negative for confusion  All other systems reviewed and are negative  Physical Exam  Physical Exam  Vitals signs and nursing note reviewed  Constitutional:       Appearance: She is well-developed  HENT:      Nose: Nose normal       Mouth/Throat:      Pharynx: No oropharyngeal exudate  Eyes:      General: No scleral icterus  Conjunctiva/sclera: Conjunctivae normal       Pupils: Pupils are equal, round, and reactive to light  Neck:      Musculoskeletal: Normal range of motion and neck supple  Vascular: No JVD  Trachea: No tracheal deviation  Cardiovascular:      Rate and Rhythm: Regular rhythm  Tachycardia present  Heart sounds: Normal heart sounds  No murmur  Pulmonary:      Effort: Pulmonary effort is normal  No respiratory distress  Breath sounds: Normal breath sounds  No wheezing or rales  Abdominal:      General: Bowel sounds are normal       Palpations: Abdomen is soft  Tenderness: There is no abdominal tenderness  There is no guarding  Musculoskeletal: Normal range of motion  General: No tenderness  Skin:     General: Skin is warm and dry  Coloration: Skin is pale  Neurological:      Mental Status: She is alert and oriented to person, place, and time  Cranial Nerves: No cranial nerve deficit  Sensory: No sensory deficit  Motor: No abnormal muscle tone        Comments: 5/5 motor, nl sens   Psychiatric:         Behavior: Behavior normal          Vital Signs  ED Triage Vitals [04/14/21 1453]   Temperature Pulse Respirations Blood Pressure SpO2   100 5 °F (38 1 °C) 103 17 136/86 96 %      Temp Source Heart Rate Source Patient Position - Orthostatic VS BP Location FiO2 (%)   Oral Monitor Lying Left arm --      Pain Score       --           Vitals:    04/14/21 1700 04/14/21 1730 04/14/21 1800 04/14/21 1900   BP: 152/81 (!) 141/102 158/72 134/75   Pulse: 104 105 103 69   Patient Position - Orthostatic VS:             Visual Acuity      ED Medications  Medications   multi-electrolyte (PLASMALYTE-A/ISOLYTE-S PH 7 4) IV solution 1,000 mL (0 mL Intravenous Stopped 4/14/21 1620)     Followed by   multi-electrolyte (PLASMALYTE-A/ISOLYTE-S PH 7 4) IV solution 1,000 mL (0 mL Intravenous Stopped 4/14/21 1656)   metoprolol (LOPRESSOR) injection 5 mg (5 mg Intravenous Given 4/14/21 1833)   clopidogrel (PLAVIX) tablet 300 mg (300 mg Oral Given 4/14/21 1831)   heparin (porcine) injection 4,000 Units (4,000 Units Intravenous Given 4/14/21 1834)   metoprolol (LOPRESSOR) injection 5 mg (5 mg Intravenous Given 4/14/21 1910)       Diagnostic Studies  Results Reviewed     Procedure Component Value Units Date/Time    Procalcitonin with AM Reflex [[id number]]  (Normal) Collected: 04/14/21 1451    Lab Status: Final result Specimen: Blood from Arm, Right Updated: 04/14/21 1950     Procalcitonin <0 05 ng/ml     Procalcitonin Reflex [[id number]]     Lab Status: No result Specimen: Blood     APTT six (6) hours after Heparin bolus/drip initiation or dosing change [[id number]]     Lab Status: No result Specimen: Blood     Troponin I repeat in 3hrs [[id number]]  (Abnormal) Collected: 04/14/21 9550 Lab Status: Final result Specimen: Blood from Arm, Left Updated: 04/14/21 1817     Troponin I 41 39 ng/mL     Urine Microscopic [[id number]]  (Normal) Collected: 04/14/21 1615    Lab Status: Final result Specimen: Urine, Clean Catch Updated: 04/14/21 1634     RBC, UA 2-4 /hpf      WBC, UA 0-1 /hpf      Epithelial Cells Occasional /hpf      Bacteria, UA Occasional /hpf     UA w Reflex to Microscopic w Reflex to Culture [[id number]]  (Abnormal) Collected: 04/14/21 1615    Lab Status: Final result Specimen: Urine, Clean Catch Updated: 04/14/21 1631     Color, UA Straw     Clarity, UA Clear     Specific Gravity, UA <=1 005     pH, UA 7 5     Leukocytes, UA Negative     Nitrite, UA Negative     Protein, UA Negative mg/dl      Glucose, UA Negative mg/dl      Ketones, UA Negative mg/dl      Urobilinogen, UA 0 2 E U /dl      Bilirubin, UA Negative     Blood, UA 2+    COVID19, Influenza A/B, RSV [facility] [[id number]]  (Normal) Collected: 04/14/21 1457    Lab Status: Final result Specimen: Nares from Nasopharyngeal Swab Updated: 04/14/21 1604     SARS-CoV-2 Negative     INFLUENZA A PCR Negative     INFLUENZA B PCR Negative     RSV PCR Negative    Narrative: This test has been authorized by FDA under an EUA (Emergency Use Assay) for use by authorized laboratories  Clinical caution and judgement should be used with the interpretation of these results with consideration of the clinical impression and other laboratory testing  Testing reported as "Positive" or "Negative" has been proven to be accurate according to standard laboratory validation requirements  All testing is performed with control materials showing appropriate reactivity at standard intervals      Troponin I [[id number]]  (Abnormal) Collected: 04/14/21 1451    Lab Status: Final result Specimen: Blood from Arm, Right Updated: 04/14/21 1538     Troponin I 45 53 ng/mL     Comprehensive metabolic panel [[id number]]  (Abnormal) Collected: 04/14/21 1451    Lab Status: Final result Specimen: Blood from Arm, Right Updated: 04/14/21 1531     Sodium 131 mmol/L      Potassium 3 6 mmol/L      Chloride 98 mmol/L      CO2 22 mmol/L      ANION GAP 11 mmol/L      BUN 14 mg/dL      Creatinine 0 84 mg/dL      Glucose 120 mg/dL      Calcium 9 4 mg/dL       U/L      ALT 44 U/L      Alkaline Phosphatase 49 U/L      Total Protein 7 1 g/dL      Albumin 4 0 g/dL      Total Bilirubin 1 00 mg/dL      eGFR 67 ml/min/1 73sq m     Narrative:      [place] guidelines for Chronic Kidney Disease (CKD):     Stage 1 with normal or high GFR (GFR > 90 mL/min/1 73 square meters)    Stage 2 Mild CKD (GFR = 60-89 mL/min/1 73 square meters)    Stage 3A Moderate CKD (GFR = 45-59 mL/min/1 73 square meters)    Stage 3B Moderate CKD (GFR = 30-44 mL/min/1 73 square meters)    Stage 4 Severe CKD (GFR = 15-29 mL/min/1 73 square meters)    Stage 5 End Stage CKD (GFR <15 mL/min/1 73 square meters)  Note: GFR calculation is accurate only with a steady state creatinine    Lactic acid [[id number]]  (Normal) Collected: 04/14/21 1451    Lab Status: Final result Specimen: Blood from Arm, Right Updated: 04/14/21 1531     LACTIC ACID 1 1 mmol/L     Narrative:      Result may be elevated if tourniquet was used during collection      Sheba Cruz [[id number]]  (Normal) Collected: 04/14/21 1451    Lab Status: Final result Specimen: Blood from Arm, Right Updated: 04/14/21 1518     Protime 13 9 seconds      INR 1 08    APTT [[id number]]  (Normal) Collected: 04/14/21 1451    Lab Status: Final result Specimen: Blood from Arm, Right Updated: 04/14/21 1518     PTT 28 seconds     CBC and differential [[id number]]  (Abnormal) Collected: 04/14/21 1451    Lab Status: Final result Specimen: Blood from Arm, Right Updated: 04/14/21 1503     WBC 16 30 Thousand/uL      RBC 4 20 Million/uL      Hemoglobin 13 4 g/dL      Hematocrit 39 5 %      MCV 94 fL      MCH 31 8 pg      MCHC 33 8 g/dL      RDW 15 0 % MPV 8 2 fL      Platelets 505 Thousands/uL      Neutrophils Relative 82 %      Lymphocytes Relative 6 %      Monocytes Relative 11 %      Eosinophils Relative 0 %      Basophils Relative 1 %      Neutrophils Absolute 13 30 Thousands/µL      Lymphocytes Absolute 1 00 Thousands/µL      Monocytes Absolute 1 80 Thousand/µL      Eosinophils Absolute 0 00 Thousand/µL      Basophils Absolute 0 10 Thousands/µL     Blood culture #1 [[id number]] Collected: 04/14/21 1451    Lab Status: In process Specimen: Blood from Arm, Right Updated: 04/14/21 1457    Blood culture #2 [[id number]] Collected: 04/14/21 1451    Lab Status: In process Specimen: Blood from Arm, Right Updated: 04/14/21 1457                 XR chest 1 view portable   ED Interpretation by Inga Jewell DO (04/14 1556)   No active pulmonary disease      Final Result by Timothy De Oliveira MD (04/14 1552)   No acute cardiopulmonary disease  Findings are stable            Workstation performed: [id number]                    Procedures  ECG 12 Lead Documentation Only    Date/Time: 4/14/2021 3:41 PM  Performed by: Inga Jewell DO  Authorized by: Inga Jewell DO     Indications / Diagnosis:  Weakness  ECG reviewed by me, the ED Provider: yes    Patient location:  ED  Previous ECG:     Previous ECG:  Compared to current    Similarity:  No change    Comparison to cardiac monitor: Yes    Interpretation:     Interpretation: non-specific    Rate:     ECG rate:  106    ECG rate assessment: tachycardic    Rhythm:     Rhythm: sinus rhythm    Ectopy:     Ectopy: PVCs      PVCs:  Infrequent  QRS:     QRS axis:  Normal    QRS intervals:   Wide  Conduction:     Conduction: abnormal      Abnormal conduction: non-specific intraventricular conduction delay    ST segments:     ST segments:  Non-specific  T waves:     T waves: inverted      Inverted:  II, III, V5 and V6  Comments:      Possible age indeterminate inferior wall MI  ECG 12 Lead Documentation Only    Date/Time: 4/14/2021 3:45 PM  Performed by: Charity Beauchamp DO  Authorized by: Charity Beauchamp DO     Indications / Diagnosis:  Weakness /abnormal troponin  ECG reviewed by me, the ED Provider: yes    Patient location:  ED  Previous ECG:     Previous ECG:  Compared to current    Similarity:  No change    Comparison to cardiac monitor: Yes    Interpretation:     Interpretation: normal    Rate:     ECG rate:  98    ECG rate assessment: normal    Rhythm:     Rhythm: sinus rhythm    Ectopy:     Ectopy: none    QRS:     QRS axis:  Normal  Conduction:     Conduction: normal    ST segments:     ST segments:  Normal  T waves:     T waves: inverted      Inverted:  II, III, aVF, V5 and V6  ECG 12 Lead Documentation Only    Date/Time: 4/14/2021 6:10 PM  Performed by: Charity Beauchamp DO  Authorized by: Charity Beauchamp DO     Indications / Diagnosis:  Elevated troponin  ECG reviewed by me, the ED Provider: yes    Patient location:  ED  Previous ECG:     Previous ECG:  Compared to current    Comparison ECG info:  Less prominent T-wave inversions inferolaterally and now low voltage in the limb leads    Similarity:  Changes noted    Comparison to cardiac monitor: Yes    Interpretation:     Interpretation: non-specific    Rate:     ECG rate:  107    ECG rate assessment: tachycardic    Rhythm:     Rhythm: sinus tachycardia    Ectopy:     Ectopy: none    QRS:     QRS axis:  Normal    QRS intervals:   Wide  Conduction:     Conduction: abnormal      Abnormal conduction: non-specific intraventricular conduction delay    ST segments:     ST segments:  Non-specific  T waves:     T waves: non-specific and flattening      Flattening:  II, III, aVF, V5 and V6  CriticalCare Time  Performed by: Charity Beauchamp DO  Authorized by: Charity Beauchamp DO     Critical care provider statement:     Critical care time (minutes):  90    Critical care start time:  4/14/2021 3:45 PM    Critical care end time:  4/14/2021 6:00 PM    Critical care was necessary to treat or prevent imminent or life-threatening deterioration of the following conditions: Subacute myocardial infarction with tachycardia and weakness  Critical care was time spent personally by me on the following activities:  Blood draw for specimens, evaluation of patient's response to treatment, ordering and performing treatments and interventions, ordering and review of laboratory studies, ordering and review of radiographic studies, re-evaluation of patient's condition, review of old charts, development of treatment plan with patient or surrogate, obtaining history from patient or surrogate, discussions with consultants and examination of patient             ED Course  ED Course as of Apr 14 2055 Wed Apr 14, 2021   1607 Troponin I(!): 45 53   1651 Patient a transfer, feel her sepsis evaluation likely related to the underlying cardiac condition  Will stop fluids this time      1656 Patient requesting to speak with daughter with regards to transfer destination       Patient is feeling somewhat better  Had full-strength aspirin at the urgent care prior to coming up here  Waiting to talk to daughter with regards to transfer , suspect she had her event last night  46 Discussed with patient's daughter, who was on her way in  She prefers [facility]  Historically the daughter states she has had a total of 2 heart attacks, but it sounds like 3 stents  Her at least 2 in the right coronary that she is aware of  Her most recent heart attack was 2011  Patient remains asymptomatic       1800 Prior to patient being transferred had 2 doses of Lopressor, bring the heart rate 107 down into the 90 range  Blood pressure remains stable during this time  Heparin had been started  Plavix bolus initiated  1819 Case was discussed with Dr Patel and Dr Boyd Medicus at [facility]  I agree to accept patient step down to  Where the initial troponin  Will transfer with diagnosis of non STEMI  Will be level 2 step-down      1841 Repeat troponin a 41 noted                HEART Risk Score      Most Recent Value   Heart Score Risk Calculator   History  0 Filed at: 04/14/2021 1742   ECG  1 Filed at: 04/14/2021 1742   Age  2 Filed at: 04/14/2021 1742   Risk Factors  2 Filed at: 04/14/2021 1742   Troponin  2 Filed at: 04/14/2021 1742   HEART Score  7 Filed at: 04/14/2021 1742                      SBIRT 20yo+      Most Recent Value   SBIRT (23 yo +)   In order to provide better care to our patients, we are screening all of our patients for alcohol and drug use  Would it be okay to ask you these screening questions? Unable to answer at this time Filed at: 04/14/2021 1542                    MDM    Disposition  Final diagnoses:   Non-ST elevated myocardial infarction (non-STEMI) ([address] )     Time reflects when diagnosis was documented in both MDM as applicable and the Disposition within this note     Time User Action Codes Description Comment    4/14/2021  6:21 PM Janell Lake Add [I21 4] Non-ST elevated myocardial infarction (non-STEMI) [facility])       ED Disposition     ED Disposition Condition Date/Time Comment    Transfer to Another Facility-In Network  Wed Apr 14, 2021  6:21 PM Evelio Padilla should be transferred out to [address] MD Documentation      Most Recent Value   Patient Condition  The patient has been stabilized such that within reasonable medical probability, no material deterioration of the patient condition or the condition of the unborn child(kacy) is likely to result from the transfer   Reason for Transfer  Level of Care needed not available at this facility, No bed available at level of patient's needs   Benefits of Transfer  Specialized equipment and/or services available at the receiving facility (Include comment)________________________ Wendy Sly cardiac catheterization]   Risks of Transfer  Potential for delay in receiving treatment, Potential deterioration of medical condition, Loss of IV, Increased discomfort during transfer   Accepting Physician  Dr Nathaniel Epperson [facility]    (Name & Tel number)  Ruma Menard   Sending MD Dr Nusrat Mcarthur   Provider Certification  General risk, such as traffic hazards, adverse weather conditions, rough terrain or turbulence, possible failure of equipment (including vehicle or aircraft), or consequences of actions of persons outside the control of the transport personnel, Unanticipated needs of medical equipment and personnel during transport, Risk of worsening condition, The possibility of a transport vehicle being unavailable      RN Documentation      Most Recent Value   Accepting Facility Name, [facility]   Bed Assignment  [facility] (Name & Tel number)  Ruma Menard   Report Given to  [place] [id number]      Follow-up Information    None         Discharge Medication List as of 4/14/2021  7:27 PM      CONTINUE these medications which have NOT CHANGED    Details   aspirin (ECOTRIN LOW STRENGTH) 81 mg EC tablet Take 81 mg by mouth daily, Historical Med      levothyroxine 50 mcg tablet Take 1 tablet (50 mcg total) by mouth daily, Starting Wed 1/20/2021, Normal      lisinopril-hydrochlorothiazide (PRINZIDE,ZESTORETIC) 20-12 5 MG per tablet TAKE ONE TABLET BY MOUTH ONCE DAILY, Normal      methotrexate 2 5 MG tablet Take 2 5 mg by mouth As directed, Historical Med      !! metoprolol succinate (TOPROL-XL) 50 mg 24 hr tablet Take 1 tablet (50 mg total) by mouth daily, Starting Wed 3/25/2020, Normal      simvastatin (ZOCOR) 80 mg tablet Take 1 tablet (80 mg total) by mouth daily, Starting Wed 10/30/2019, Normal      zolpidem (AMBIEN) 10 mg tablet Take 1 tablet (10 mg total) by mouth daily at bedtime as needed for sleep, Starting Thu 8/6/2020, Normal      !! metoprolol succinate (TOPROL-XL) 50 mg 24 hr tablet Take 0 5 tablets (25 mg total) by mouth daily, Starting Thu 9/24/2020, Normal      nitroglycerin (NITROSTAT) 0 4 mg SL tablet Place 1 tablet (0 4 mg total) under the tongue every 5 (five) minutes as needed for chest pain, Starting Wed 10/3/2018, Normal      pramipexole (MIRAPEX) 1 mg tablet Take 1 tablet (1 mg total) by mouth daily at bedtime, Starting Wed 7/8/2020, Normal      traMADol (ULTRAM) 50 mg tablet Take 1 tablet (50 mg total) by mouth daily, Starting Wed 1/15/2020, Normal      valsartan-hydrochlorothiazide (DIOVAN-HCT) 160-25 MG per tablet Starting Tue 4/6/2021, Historical Med       !! - Potential duplicate medications found  Please discuss with provider  No discharge procedures on file      PDMP Review       Value Time User    PDMP Reviewed  Yes 10/22/2019  3:31 PM Debbie Vasquez, [facility]          ED Provider  Electronically Signed by           Marlea Canavan, DO  04/14/21 2055

## 2021-04-15 NOTE — PLAN OF CARE
Problem: PAIN - ADULT  Goal: Verbalizes/displays adequate comfort level or baseline comfort level  Description: Interventions:  - Encourage patient to monitor pain and request assistance  - Assess pain using appropriate pain scale  - Administer analgesics based on type and severity of pain and evaluate response  - Implement non-pharmacological measures as appropriate and evaluate response  - Consider cultural and social influences on pain and pain management  - Notify physician/advanced practitioner if interventions unsuccessful or patient reports new pain  Outcome: Progressing     Problem: INFECTION - ADULT  Goal: Absence or prevention of progression during hospitalization  Description: INTERVENTIONS:  - Assess and monitor for signs and symptoms of infection  - Monitor lab/diagnostic results  - Monitor all insertion sites, i e  indwelling lines, tubes, and drains  - Monitor endotracheal if appropriate and nasal secretions for changes in amount and color  - [place] appropriate cooling/warming therapies per order  - Administer medications as ordered  - Instruct and encourage patient and family to use good hand hygiene technique  - Identify and instruct in appropriate isolation precautions for identified infection/condition  Outcome: Progressing  Goal: Absence of fever/infection during neutropenic period  Description: INTERVENTIONS:  - Monitor WBC    Outcome: Progressing     Problem: SAFETY ADULT  Goal: Patient will remain free of falls  Description: INTERVENTIONS:  - Assess patient frequently for physical needs  -  Identify cognitive and physical deficits and behaviors that affect risk of falls    -  [place] fall precautions as indicated by assessment   - Educate patient/family on patient safety including physical limitations  - Instruct patient to call for assistance with activity based on assessment  - Modify environment to reduce risk of injury  - Consider OT/PT consult to assist with strengthening/mobility  Outcome: Progressing  Goal: Maintain or return to baseline ADL function  Description: INTERVENTIONS:  -  Assess patient's ability to carry out ADLs; assess patient's baseline for ADL function and identify physical deficits which impact ability to perform ADLs (bathing, care of mouth/teeth, toileting, grooming, dressing, etc )  - Assess/evaluate cause of self-care deficits   - Assess range of motion  - Assess patient's mobility; develop plan if impaired  - Assess patient's need for assistive devices and provide as appropriate  - Encourage maximum independence but intervene and supervise when necessary  - Involve family in performance of ADLs  - Assess for home care needs following discharge   - Consider OT consult to assist with ADL evaluation and planning for discharge  - Provide patient education as appropriate  Outcome: Progressing  Goal: Maintain or return mobility status to optimal level  Description: INTERVENTIONS:  - Assess patient's baseline mobility status (ambulation, transfers, stairs, etc )    - Identify cognitive and physical deficits and behaviors that affect mobility  - Identify mobility aids required to assist with transfers and/or ambulation (gait belt, sit-to-stand, lift, walker, cane, etc )  - [place] fall precautions as indicated by assessment  - Record patient progress and toleration of activity level on Mobility SBAR; progress patient to next Phase/Stage  - Instruct patient to call for assistance with activity based on assessment  - Consider rehabilitation consult to assist with strengthening/weightbearing, etc   Outcome: Progressing     Problem: DISCHARGE PLANNING  Goal: Discharge to home or other facility with appropriate resources  Description: INTERVENTIONS:  - Identify barriers to discharge w/patient and caregiver  - Arrange for needed discharge resources and transportation as appropriate  - Identify discharge learning needs (meds, wound care, etc )  - Arrange for interpretive services to assist at discharge as needed  - Refer to Case Management Department for coordinating discharge planning if the patient needs post-hospital services based on physician/advanced practitioner order or complex needs related to functional status, cognitive ability, or social support system  Outcome: Progressing     Problem: Knowledge Deficit  Goal: Patient/family/caregiver demonstrates understanding of disease process, treatment plan, medications, and discharge instructions  Description: Complete learning assessment and assess knowledge base    Interventions:  - Provide teaching at level of understanding  - Provide teaching via preferred learning methods  Outcome: Progressing     Problem: CARDIOVASCULAR - ADULT  Goal: Maintains optimal cardiac output and hemodynamic stability  Description: INTERVENTIONS:  - Monitor I/O, vital signs and rhythm  - Monitor for S/S and trends of decreased cardiac output  - Administer and titrate ordered vasoactive medications to optimize hemodynamic stability  - Assess quality of pulses, skin color and temperature  - Assess for signs of decreased coronary artery perfusion  - Instruct patient to report change in severity of symptoms  Outcome: Progressing  Goal: Absence of cardiac dysrhythmias or at baseline rhythm  Description: INTERVENTIONS:  - Continuous cardiac monitoring, vital signs, obtain 12 lead EKG if ordered  - Administer antiarrhythmic and heart rate control medications as ordered  - Monitor electrolytes and administer replacement therapy as ordered  Outcome: Progressing

## 2021-04-15 NOTE — PHYSICAL THERAPY NOTE
PHYSICAL THERAPY NOTE          Patient Name: Tigist Ingram  [id number] Date: 4/15/2021     PT orders received  Chart reviewed  PT eval deferred at this time as pt w/ 40 0 troponin  Cardiology consult pending  Will await cardiology clearance  Will continue to follow as medically appropriate   Evelyne Spurling, PT

## 2021-04-15 NOTE — ASSESSMENT & PLAN NOTE
· Elevated  in setting of MI   Normal ALT and T bili  · Will order acute hepatitis panel and hep C antibody  · AST still elevated this AM likely due to MI

## 2021-04-15 NOTE — PHYSICAL THERAPY NOTE
PHYSICAL THERAPY NOTE          Patient Name: Radha Dawn  [id number] Date: 4/15/2021    Checked pt status for PT eval  Per RN pt not appropriate for PT evaluation at this time 2* medical status       Diego Valentin

## 2021-04-15 NOTE — ASSESSMENT & PLAN NOTE
· Transferred from [facility] for NSTEMI  Troponin 45 5, EKG: nonspecific ST and T wave abnormality, possible infarct  Continue to trend troponin and EKG  · CXR negative for acute cardiopulmonary disease  · Started on heparin drip prior to transfer, will continue  Monitor PTT  · Given ASA and plavix bolus prior to transfer   Continue daily ASA 81mg, Plavix 75mg and statin  · Maintained on Toprol 25mg daily  · PRN NTG if chest pain   · Provide supplemental oxygen if O2 sat less than 90% or patient is in respiratory distress  · Lipid panel and HgA1c ordered  · Echocardiogram ordered  · NPO after midnight for possible cardiac catheterization  · Continuous telemetry monitoring  · Cardiology consult

## 2021-04-15 NOTE — CONSULTS
Consultation - Infectious Disease   Pavel Rob 66 y o  female MRN: [id number]  Unit/Bed#: E4 -01 Encounter: [id number]      IMPRESSION & RECOMMENDATIONS:   Impression/Recommendations:  1  Sepsis, POA  Tachycarida, WBC, later fever  Unclear source  ROS and exam benign  UA, Flu/RSV/COVID PCR, procalcitonin, blood cultures, CXR negative  Consider occult GI source  AST elevated but may be cardiac in origin in setting of #2  Consider noninfectious due to large MI  Clinically stable and non-toxic  Rec:  · Continue to follow closely off antibiotics  · Follow up final blood cultures  · Check CT C/A/P   · Follow temperatures closely  · Check CBC in AM  · Supportive care as per the primary service    2  NSTEMI  Presented with chest pressure, elevated troponin  Rec:  · Cardiology follow-up ongoing  · If blood cultures remain negative, OK from ID for cardiac cath tomorrow if clinically indicated    3   RA  On baricitinib and methotrexate  The above impression and plan was discussed in detail with Adilia Dougherty with Cardiology  Antibiotics:  None since admission    Thank you for this consultation  We will follow along with you  HISTORY OF PRESENT ILLNESS:  Reason for Consult: Fever    HPI: Pavel Rob is a 66 y o  female with RA on baricitinib and methotrexate  She presented to the emergency department yesterday complaining of new onset of chest pressure  Upon presentation she was noted to have a low-grade fever with tachycardia  She later spiked a temperature to 103  Her labs revealed a leukocytosis and significantly elevated troponin  Her procalcitonin,  Flu/RSV/ COVID PCR was negative  A chest x-ray showed no pneumonia  She has been observed off antibiotics  Upon questioning she denies any other symptoms including headache, rhinorrhea, sore throat, cough, nausea, vomiting, diarrhea, dysuria, frequency, hesitancy, or skin lesions    Given her fever of unclear etiology we are asked to comment on further evaluation and management  In performing this consult, I have reviewed prior admission and outpatient visit records in detail  REVIEW OF SYSTEMS:  As per HPI  A complete system-based review of systems is otherwise negative  PAST MEDICAL HISTORY:  Past Medical History:   Diagnosis Date    Atherosclerosis of CABG, unsp, w oth angina pectoris (HCC)     Cellulitis     Essential hypertension     Hypothyroidism     Myocardial infarction (HCC)     Rheumatoid arthritis ([address] )      Past Surgical History:   Procedure Laterality Date    CYSTOSCOPY      HYSTERECTOMY      total    OTHER SURGICAL HISTORY      heart stent    TONSILLECTOMY         FAMILY HISTORY:  Non-contributory    SOCIAL HISTORY:  Social History     Substance and Sexual Activity   Alcohol Use Yes    Frequency: Monthly or less    Comment: rarely     Social History     Substance and Sexual Activity   Drug Use No     Social History     Tobacco Use   Smoking Status Heavy Tobacco Smoker    Packs/day: 1 00    Types: Cigarettes   Smokeless Tobacco Never Used   Tobacco Comment    compulsive smoker-wont quit       ALLERGIES:  Allergies   Allergen Reactions    Clarithromycin Other (See Comments)     Other reaction(s): tongue lesions and burning  BIAXIN- tongue cracks & gets bumps      Penicillins Other (See Comments)     tongue cracks & gets bumps  Other reaction(s): tongues lesions and burning         MEDICATIONS:  All current active medications have been reviewed      PHYSICAL EXAM:  Vitals:  Temp:  [97 3 °F (36 3 °C)-103 °F (39 4 °C)] 97 6 °F (36 4 °C)  HR:  [] 95  Resp:  [16-36] 16  BP: (107-158)/() 119/63  SpO2:  [91 %-99 %] 95 %  Temp (24hrs), Av 3 °F (37 4 °C), Min:97 3 °F (36 3 °C), Max:103 °F (39 4 °C)  Current: Temperature: 97 6 °F (36 4 °C)     Physical Exam:  General:  Well-nourished, well-developed, in no acute distress  Eyes:  Conjunctive clear with no hemorrhages or effusions  Oropharynx:  No ulcers, no lesions  Neck:  Supple, no lymphadenopathy  Lungs:  Clear to auscultation bilaterally anteriorly, no accessory muscle use  Cardiac:  Regular rate and rhythm, no murmurs  Abdomen:  Soft, non-tender, non-distended  Extremities:  No peripheral cyanosis, clubbing, or edema  Skin:  No rashes, no ulcers  Neurological:  Moves all four extremities spontaneously, sensation grossly intact,  Sleeping, easily arousable but then groggy, falls back to sleep    LABS, IMAGING, & OTHER STUDIES:  Lab Results:  I have personally reviewed pertinent labs  Results from last 7 days   Lab Units 04/15/21  0833 04/14/21  1451   POTASSIUM mmol/L 3 3* 3 6   CHLORIDE mmol/L 101 98   CO2 mmol/L 23 22   BUN mg/dL 14 14   CREATININE mg/dL 0 88 0 84   EGFR ml/min/1 73sq m 64 67   CALCIUM mg/dL 8 5 9 4   AST U/L 336* 266*   ALT U/L 57 44   ALK PHOS U/L 51 49*     Results from last 7 days   Lab Units 04/15/21  0833 04/14/21  1451   WBC Thousand/uL 17 12* 16 30*   HEMOGLOBIN g/dL 12 5 13 4   PLATELETS Thousands/uL 183 223     Results from last 7 days   Lab Units 04/14/21  1451   BLOOD CULTURE  Received in Microbiology Lab  Culture in Progress  Received in Microbiology Lab  Culture in Progress  Imaging Studies:   I have personally reviewed pertinent imaging study reports and images in PACS  Chest x-ray reviewed personally no pneumonia    EKG, Pathology, and Other Studies:   I have personally reviewed pertinent reports

## 2021-04-15 NOTE — PROGRESS NOTES
[address]  Progress Note - Nicole Callaway 1945, 66 y o  female MRN: [id number]  Unit/Bed#: E4 -01 Encounter: [id number]  Primary Care Provider: LUCIA Briones   Date and time admitted to hospital: 4/14/2021  8:05 PM    Elevated AST (SGOT)  Assessment & Plan  · Elevated  in setting of MI  Normal ALT and T bili  · Will order acute hepatitis panel and hep C antibody  · AST still elevated this AM likely due to MI  Hyponatremia  Assessment & Plan  · Na 131 on admission, now 135  On Hyzaar  Will hold HCTZ  · Repeat a m  BMP    Cigarette smoker  Assessment & Plan  · Smokes 1 pack per day  Declined nicotine TD patch  · Tobacco cessation education    SIRS (systemic inflammatory response syndrome) ([facility])  Assessment & Plan  · Sirs POA with fever 100 5F, tachypnea, tachycardia and leukocytosis  · CXR negative for acute cardiopulmonary disease  · COVID-19 PCR negative  UA negative  · PCT negative, will defer antibiotics  · Repeat procalcitonin negative X2  · Follow-up blood culture  · Per ID, no antibiotics necessary as patient is clinically stable  · Will proceed with CT abdomen pelvis    High cholesterol  Assessment & Plan  · Continue statin    Essential hypertension  Assessment & Plan  · Maintained on Losartan-HCTZ and Toprol  · HCTZ on hold in setting of hyponatremia  Continue losartan & Toprol    COPD (chronic obstructive pulmonary disease) ([facility])  Assessment & Plan  · No acute exacerbation, not on maintenance inhaler    Coronary artery disease involving native coronary artery of native heart with angina pectoris ([facility])  Assessment & Plan  · CAD h/o MI and stents  Maintained on ASA, BB and statin    * NSTEMI (non-ST elevated myocardial infarction) [facility])  Assessment & Plan  · Transferred from [facility] for NSTEMI  Troponin 45 5, EKG: nonspecific ST and T wave abnormality, possible infarct  Continue to trend troponin and EKG     · CXR negative for acute cardiopulmonary disease  · Started on heparin drip , will continue  Monitor PTT  · Given ASA and plavix bolus prior to transfer  Continue daily ASA 81mg, Plavix 75mg and statin  · Maintained on Toprol 25mg daily  · PRN NTG if chest pain   · Provide supplemental oxygen if O2 sat less than 90% or patient is in respiratory distress  · Continuous telemetry monitoring  · Cardiology follows  · Plan for cardiac catheterization when sepsis ruled out  · 2D echo ordered and pending      VTE Pharmacologic Prophylaxis:   Pharmacologic: Heparin drip  Mechanical VTE Prophylaxis in Place: Yes    Patient Centered Rounds: I have performed bedside rounds with nursing staff today  Discussions with Specialists or Other Care Team Provider: nursing    Education and Discussions with Family / Patient: patient    Time Spent for Care: 30 minutes  More than 50% of total time spent on counseling and coordination of care as described above  Current Length of Stay: 1 day(s)    Current Patient Status: Inpatient   Certification Statement: The patient will continue to require additional inpatient hospital stay due to NSTEMI    Discharge Plan: 72 hrs    Code Status: Level 1 - Full Code      Subjective:   Patient was seen and examined  She denies any chest pain or shortness of breath    Objective:     Vitals:   Temp (24hrs), Av 1 °F (37 3 °C), Min:97 3 °F (36 3 °C), Max:103 °F (39 4 °C)    Temp:  [97 3 °F (36 3 °C)-103 °F (39 4 °C)] 98 5 °F (36 9 °C)  HR:  [] 107  Resp:  [16-36] 16  BP: (107-158)/() 112/73  SpO2:  [94 %-99 %] 94 %  Body mass index is 30 93 kg/m²  Input and Output Summary (last 24 hours): Intake/Output Summary (Last 24 hours) at 4/15/2021 1639  Last data filed at 4/15/2021 1524  Gross per 24 hour   Intake 480 ml   Output 500 ml   Net -20 ml       Physical Exam:     Physical Exam  Constitutional:       Appearance: She is well-developed  Neck:      Musculoskeletal: Normal range of motion     Cardiovascular: Rate and Rhythm: Normal rate and regular rhythm  Pulmonary:      Effort: Pulmonary effort is normal       Breath sounds: Normal breath sounds  Abdominal:      Palpations: Abdomen is soft  Skin:     General: Skin is warm  Findings: No erythema  Neurological:      Mental Status: She is alert  Psychiatric:         Behavior: Behavior normal          Additional Data:     Labs:    Results from last 7 days   Lab Units 04/15/21  0833   WBC Thousand/uL 17 12*   HEMOGLOBIN g/dL 12 5   HEMATOCRIT % 36 6   PLATELETS Thousands/uL 183   NEUTROS PCT % 79*   LYMPHS PCT % 8*   MONOS PCT % 12   EOS PCT % 0     Results from last 7 days   Lab Units 04/15/21  0833   SODIUM mmol/L 135*   POTASSIUM mmol/L 3 3*   CHLORIDE mmol/L 101   CO2 mmol/L 23   BUN mg/dL 14   CREATININE mg/dL 0 88   ANION GAP mmol/L 11   CALCIUM mg/dL 8 5   ALBUMIN g/dL 2 9*   TOTAL BILIRUBIN mg/dL 1 32*   ALK PHOS U/L 51   ALT U/L 57   AST U/L 336*   GLUCOSE RANDOM mg/dL 101     Results from last 7 days   Lab Units 04/14/21  1451   INR  1 08         Results from last 7 days   Lab Units 04/15/21  0833   HEMOGLOBIN A1C % 5 5     Results from last 7 days   Lab Units 04/15/21  0833 04/14/21  1451   LACTIC ACID mmol/L  --  1 1   PROCALCITONIN ng/ml 0 14 <0 05           * I Have Reviewed All Lab Data Listed Above  * Additional Pertinent Lab Tests Reviewed: All Labs Within Last 24 Hours Reviewed    Imaging:  Chest x-ray  Recent Cultures (last 7 days):     Results from last 7 days   Lab Units 04/14/21  1451   BLOOD CULTURE  Received in Microbiology Lab  Culture in Progress  Received in Microbiology Lab  Culture in Progress         Last 24 Hours Medication List:   Current Facility-Administered Medications   Medication Dose Route Frequency Provider Last Rate    acetaminophen  650 mg Oral Q4H PRN Vasquez Breslow, CRNP      aluminum-magnesium hydroxide-simethicone  30 mL Oral Q6H PRN Vasquez Breslow, CRNP      aspirin  81 mg Oral Daily Aruna Campoverde Kristie Feng, CRNP      atorvastatin  40 mg Oral Daily With [employer], CRNP      clopidogrel  75 mg Oral Daily Gurney Cables, CRNP      heparin (porcine)  3-20 Units/kg/hr (Order-Specific) Intravenous Titrated Gurney Cables, CRNP 14 Units/kg/hr (04/15/21 1611)    heparin (porcine)  2,000 Units Intravenous Q1H PRN Gurney Cables, CRNP      heparin (porcine)  4,000 Units Intravenous Q1H PRN Gurney Cables, CRNP      HYDROmorphone  0 2 mg Intravenous Q4H PRN Gurney Cables, CRNP      levothyroxine  50 mcg Oral Early Morning Gurney Cables, CRNP      losartan  100 mg Oral Daily Gurney Cables, CRNP      metoprolol succinate  25 mg Oral Daily Gurney Cables, [address]      nitroglycerin  0 4 mg Sublingual Q5 Min PRN Gurney Cables, CRNP      ondansetron  4 mg Intravenous Q6H PRN Gurney Cables, CRNP      oxyCODONE  2 5 mg Oral Q4H PRN Gurney Cables, CRNP      zolpidem  10 mg Oral HS PRN Gurney Cables, CRNP          Today, Patient Was Seen By: Merlene Paris MD    ** Please Note: Dictation voice to text software may have been used in the creation of this document   **

## 2021-04-15 NOTE — ASSESSMENT & PLAN NOTE
· Maintained on Losartan-HCTZ and Toprol  · HCTZ on hold in setting of hyponatremia    Continue losartan & Toprol

## 2021-04-15 NOTE — PLAN OF CARE
Problem: PAIN - ADULT  Goal: Verbalizes/displays adequate comfort level or baseline comfort level  Description: Interventions:  - Encourage patient to monitor pain and request assistance  - Assess pain using appropriate pain scale  - Administer analgesics based on type and severity of pain and evaluate response  - Implement non-pharmacological measures as appropriate and evaluate response  - Consider cultural and social influences on pain and pain management  - Notify physician/advanced practitioner if interventions unsuccessful or patient reports new pain  Outcome: Progressing     Problem: INFECTION - ADULT  Goal: Absence or prevention of progression during hospitalization  Description: INTERVENTIONS:  - Assess and monitor for signs and symptoms of infection  - Monitor lab/diagnostic results  - Monitor all insertion sites, i e  indwelling lines, tubes, and drains  - Monitor endotracheal if appropriate and nasal secretions for changes in amount and color  - [place] appropriate cooling/warming therapies per order  - Administer medications as ordered  - Instruct and encourage patient and family to use good hand hygiene technique  - Identify and instruct in appropriate isolation precautions for identified infection/condition  Outcome: Progressing     Problem: SAFETY ADULT  Goal: Patient will remain free of falls  Description: INTERVENTIONS:  - Assess patient frequently for physical needs  -  Identify cognitive and physical deficits and behaviors that affect risk of falls    -  [place] fall precautions as indicated by assessment   - Educate patient/family on patient safety including physical limitations  - Instruct patient to call for assistance with activity based on assessment  - Modify environment to reduce risk of injury  - Consider OT/PT consult to assist with strengthening/mobility  Outcome: Progressing  Goal: Maintain or return to baseline ADL function  Description: INTERVENTIONS:  -  Assess patient's ability to carry out ADLs; assess patient's baseline for ADL function and identify physical deficits which impact ability to perform ADLs (bathing, care of mouth/teeth, toileting, grooming, dressing, etc )  - Assess/evaluate cause of self-care deficits   - Assess range of motion  - Assess patient's mobility; develop plan if impaired  - Assess patient's need for assistive devices and provide as appropriate  - Encourage maximum independence but intervene and supervise when necessary  - Involve family in performance of ADLs  - Assess for home care needs following discharge   - Consider OT consult to assist with ADL evaluation and planning for discharge  - Provide patient education as appropriate  Outcome: Progressing  Goal: Maintain or return mobility status to optimal level  Description: INTERVENTIONS:  - Assess patient's baseline mobility status (ambulation, transfers, stairs, etc )    - Identify cognitive and physical deficits and behaviors that affect mobility  - Identify mobility aids required to assist with transfers and/or ambulation (gait belt, sit-to-stand, lift, walker, cane, etc )  - [place] fall precautions as indicated by assessment  - Record patient progress and toleration of activity level on Mobility SBAR; progress patient to next Phase/Stage  - Instruct patient to call for assistance with activity based on assessment  - Consider rehabilitation consult to assist with strengthening/weightbearing, etc   Outcome: Progressing     Problem: DISCHARGE PLANNING  Goal: Discharge to home or other facility with appropriate resources  Description: INTERVENTIONS:  - Identify barriers to discharge w/patient and caregiver  - Arrange for needed discharge resources and transportation as appropriate  - Identify discharge learning needs (meds, wound care, etc )  - Arrange for interpretive services to assist at discharge as needed  - Refer to Case Management Department for coordinating discharge planning if the patient needs post-hospital services based on physician/advanced practitioner order or complex needs related to functional status, cognitive ability, or social support system  Outcome: Progressing     Problem: Knowledge Deficit  Goal: Patient/family/caregiver demonstrates understanding of disease process, treatment plan, medications, and discharge instructions  Description: Complete learning assessment and assess knowledge base    Interventions:  - Provide teaching at level of understanding  - Provide teaching via preferred learning methods  Outcome: Progressing     Problem: CARDIOVASCULAR - ADULT  Goal: Maintains optimal cardiac output and hemodynamic stability  Description: INTERVENTIONS:  - Monitor I/O, vital signs and rhythm  - Monitor for S/S and trends of decreased cardiac output  - Administer and titrate ordered vasoactive medications to optimize hemodynamic stability  - Assess quality of pulses, skin color and temperature  - Assess for signs of decreased coronary artery perfusion  - Instruct patient to report change in severity of symptoms  Outcome: Progressing  Goal: Absence of cardiac dysrhythmias or at baseline rhythm  Description: INTERVENTIONS:  - Continuous cardiac monitoring, vital signs, obtain 12 lead EKG if ordered  - Administer antiarrhythmic and heart rate control medications as ordered  - Monitor electrolytes and administer replacement therapy as ordered  Outcome: Progressing     Problem: Prexisting or High Potential for Compromised Skin Integrity  Goal: Skin integrity is maintained or improved  Description: INTERVENTIONS:  - Identify patients at risk for skin breakdown  - Assess and monitor skin integrity  - Assess and monitor nutrition and hydration status  - Monitor labs   - Assess for incontinence   - Turn and reposition patient  - Assist with mobility/ambulation  - Relieve pressure over bony prominences  - Avoid friction and shearing  - Provide appropriate hygiene as needed including keeping skin clean and dry  - Evaluate need for skin moisturizer/barrier cream  - Collaborate with interdisciplinary team   - Patient/family teaching  - Consider wound care consult   Outcome: Progressing     Problem: Potential for Falls  Goal: Patient will remain free of falls  Description: INTERVENTIONS:  - Assess patient frequently for physical needs  -  Identify cognitive and physical deficits and behaviors that affect risk of falls    -  [place] fall precautions as indicated by assessment   - Educate patient/family on patient safety including physical limitations  - Instruct patient to call for assistance with activity based on assessment  - Modify environment to reduce risk of injury  - Consider OT/PT consult to assist with strengthening/mobility  Outcome: Progressing

## 2021-04-15 NOTE — ASSESSMENT & PLAN NOTE
· Sirs POA with fever 100 5F, tachypnea, tachycardia and leukocytosis  · CXR negative for acute cardiopulmonary disease  · COVID-19 PCR negative  UA negative    · PCT negative, will defer antibiotics  · Repeat procalcitonin  · Follow-up blood culture

## 2021-04-15 NOTE — ASSESSMENT & PLAN NOTE
· Transferred from [facility], [place], operated by [facility] for NSTEMI  Troponin 45 5, EKG: nonspecific ST and T wave abnormality, possible infarct  Continue to trend troponin and EKG  · CXR negative for acute cardiopulmonary disease  · Started on heparin drip , will continue  Monitor PTT  · Given ASA and plavix bolus prior to transfer  Continue daily ASA 81mg, Plavix 75mg and statin  · Maintained on Toprol 25mg daily  · PRN NTG if chest pain   · Provide supplemental oxygen if O2 sat less than 90% or patient is in respiratory distress  · Continuous telemetry monitoring  · Cardiology follows  · Plan for cardiac catheterization when sepsis ruled out    · 2D echo ordered and pending

## 2021-04-15 NOTE — H&P
[address]  H&P- Aris Mathews 1945, 66 y o  female MRN: [id number]  Unit/Bed#: E4 -01 Encounter: [id number]  Primary Care Provider: LUCIA Guzman   Date and time admitted to hospital: 4/14/2021  8:05 PM      * NSTEMI (non-ST elevated myocardial infarction) [facility])  Assessment & Plan  · Transferred from [facility] for NSTEMI  Troponin 45 5, EKG: nonspecific ST and T wave abnormality, possible infarct  Continue to trend troponin and EKG  · CXR negative for acute cardiopulmonary disease  · Started on heparin drip prior to transfer, will continue  Monitor PTT  · Given ASA and plavix bolus prior to transfer  Continue daily ASA 81mg, Plavix 75mg and statin  · Maintained on Toprol 25mg daily  · PRN NTG if chest pain   · Provide supplemental oxygen if O2 sat less than 90% or patient is in respiratory distress  · Lipid panel and HgA1c ordered  · Echocardiogram ordered  · NPO after midnight for possible cardiac catheterization  · Continuous telemetry monitoring  · Cardiology consult    SIRS (systemic inflammatory response syndrome) ([facility])  Assessment & Plan  · Sirs POA with fever 100 5F, tachypnea, tachycardia and leukocytosis  · CXR negative for acute cardiopulmonary disease  · COVID-19 PCR negative  UA negative  · PCT negative, will defer antibiotics  · Repeat procalcitonin  · Follow-up blood culture    Elevated AST (SGOT)  Assessment & Plan  · Elevated  in setting of MI  Normal ALT and T bili  · Will order acute hepatitis panel and hep C antibody  · Follow up CMP    Hyponatremia  Assessment & Plan  · Na 131  On Hyzaar  Will hold HCTZ  · Repeat a m  BMP    Cigarette smoker  Assessment & Plan  · Smokes 1 pack per day    Declined nicotine TD patch  · Tobacco cessation education    COPD (chronic obstructive pulmonary disease) ([facility])  Assessment & Plan  · No acute exacerbation, not on maintenance inhaler    Coronary artery disease involving native coronary artery of native heart with angina pectoris [facility])  Assessment & Plan  · CAD h/o MI and stents  Maintained on ASA, BB and statin    High cholesterol  Assessment & Plan  · Continue statin    Essential hypertension  Assessment & Plan  · Maintained on Losartan-HCTZ and Toprol  · HCTZ on hold in setting of hyponatremia  Continue losartan & Toprol    VTE Prophylaxis: Heparin Drip  / sequential compression device   Code Status: FC  POLST: POLST is not applicable to this patient  Discussion with family: Daughter Ermias Yu    Anticipated Length of Stay:  Patient will be admitted on an Inpatient basis with an anticipated length of stay of  > 2 midnights  Justification for Hospital Stay: NSTEMI, SIRS    Total Time for Visit, including Counseling / Coordination of Care: 1 hour  Greater than 50% of this total time spent on direct patient counseling and coordination of care  Chief Complaint:   Mid chest pressure    History of Present Illness:    Lynn May is a 66 y o  female who was transferred from [facility]  Patient presented to urgent care today with complaints of chest pressure and generalized body aches, EKG appeared abnormal, patient appeared short of breath, ASA bolus given, patient sent to ED for further evaluation  Patient was sent to [facility], temperature elevated 100 5F, elevated troponin 45 53  Patient tachycardic and was given Lopressor  Heparin drip started  Transferred for SD level II monitoring and Cardiology consult  Patient reports last night she went to bed, laid on her side and felt a "weird sensation in the middle of her chest " States she felt better when she laid on her back, denies chest pain, nausea, shortness of breath or diaphoresis, nonradiating  Reports history of 5 MI's in the past, states symptoms were different than last night  States she did not think it was necessary to come to the hospital although her daughter convinced her to come      Review of Systems:    Review of Systems Constitutional: Negative  HENT: Negative  Respiratory: Positive for cough  Chronic cough, unproductive   Cardiovascular: Negative for chest pain, palpitations and leg swelling  Chest pressure   Gastrointestinal: Negative  Genitourinary: Negative  Urinary incontinence   Musculoskeletal: Negative  Skin: Negative  Neurological: Negative  Psychiatric/Behavioral: Negative  Past Medical and Surgical History:     Past Medical History:   Diagnosis Date    Atherosclerosis of CABG, unsp, w oth angina pectoris (HCC)     Cellulitis     Essential hypertension     Hypothyroidism     [facility] infarction ([facility] [address] )     Rheumatoid arthritis ([facility] )        Past Surgical History:   Procedure Laterality Date    CYSTOSCOPY      HYSTERECTOMY      total    OTHER SURGICAL HISTORY      heart stent    TONSILLECTOMY         Meds/Allergies:    Prior to Admission medications    Medication Sig Start Date End Date Taking?  Authorizing Provider   aspirin (ECOTRIN LOW STRENGTH) 81 mg EC tablet Take 81 mg by mouth daily   Yes Historical Provider, MD   levothyroxine 50 mcg tablet Take 1 tablet (50 mcg total) by mouth daily 1/20/21  Yes LUCIA Guzman   lisinopril-hydrochlorothiazide (PRINZIDE,ZESTORETIC) 20-12 5 MG per tablet TAKE ONE TABLET BY MOUTH ONCE DAILY 3/17/20  Yes LUCIA Guzman   methotrexate 2 5 MG tablet Take 2 5 mg by mouth As directed   Yes Historical Provider, MD   metoprolol succinate (TOPROL-XL) 50 mg 24 hr tablet Take 1 tablet (50 mg total) by mouth daily 3/25/20  Yes LUCIA Guzman   simvastatin (ZOCOR) 80 mg tablet Take 1 tablet (80 mg total) by mouth daily 10/30/19  Yes LUCIA Guzman   zolpidem (AMBIEN) 10 mg tablet Take 1 tablet (10 mg total) by mouth daily at bedtime as needed for sleep 8/6/20  Yes LUCIA Morgan   metoprolol succinate (TOPROL-XL) 50 mg 24 hr tablet Take 0 5 tablets (25 mg total) by mouth daily  Patient not taking: Reported on 4/13/2021 9/24/20   LUCIA Velez   nitroglycerin (NITROSTAT) 0 4 mg SL tablet Place 1 tablet (0 4 mg total) under the tongue every 5 (five) minutes as needed for chest pain 10/3/18   Fer Mazariegos MD   pramipexole (MIRAPEX) 1 mg tablet Take 1 tablet (1 mg total) by mouth daily at bedtime  Patient not taking: Reported on 4/13/2021 7/8/20   LUCIA Velez   traMADol (ULTRAM) 50 mg tablet Take 1 tablet (50 mg total) by mouth daily  Patient not taking: Reported on 4/13/2021 1/15/20   LUCIA Velez   valsartan-hydrochlorothiazide (DIOVAN-HCT) 160-25 MG per tablet  4/6/21   Historical Provider, MD     I have reviewed home medications with patient personally  Allergies:    Allergies   Allergen Reactions    Clarithromycin Other (See Comments)     Other reaction(s): tongue lesions and burning  BIAXIN- tongue cracks & gets bumps      Penicillins Other (See Comments)     tongue cracks & gets bumps  Other reaction(s): tongues lesions and burning         Social History:     Marital Status:    Occupation:  Home health aide  Patient Pre-hospital Living Situation:  Resides at home with daughter and son-in-law  Patient Pre-hospital Level of Mobility:  Ambulatory  Patient Pre-hospital Diet Restrictions:   Substance Use History:   Social History     Substance and Sexual Activity   Alcohol Use Yes    Frequency: Monthly or less    Comment: rarely     Social History     Tobacco Use   Smoking Status Heavy Tobacco Smoker    Packs/day: 1 00    Types: Cigarettes   Smokeless Tobacco Never Used   Tobacco Comment    compulsive smoker-wont quit     Social History     Substance and Sexual Activity   Drug Use No       Family History:    Family History   Problem Relation Age of Onset    Heart attack Mother     Heart attack Brother     Heart attack Brother        Physical Exam:     Vitals:   Blood Pressure: 107/70 (04/14/21 2021)  Pulse: 96 (04/14/21 2021)  Temperature: 99 1 °F (37 3 °C) (04/14/21 2021)  Temp Source: Temporal (04/14/21 2021)  Respirations: 21 (04/14/21 2021)  Height: 5' 5" (165 1 cm) (04/14/21 2021)  Weight - Scale: 84 3 kg (185 lb 13 6 oz) (04/14/21 2021)  SpO2: 96 % (04/14/21 2021)    Physical Exam  Constitutional:       General: She is not in acute distress  Appearance: Normal appearance  She is normal weight  She is not ill-appearing, toxic-appearing or diaphoretic  HENT:      Head: Normocephalic and atraumatic  Nose: No congestion or rhinorrhea  Mouth/Throat:      Mouth: Mucous membranes are moist    Eyes:      General: No scleral icterus  Right eye: No discharge  Left eye: No discharge  Conjunctiva/sclera: Conjunctivae normal    Neck:      Musculoskeletal: Neck supple  Comments: No JVD  Cardiovascular:      Rate and Rhythm: Normal rate and regular rhythm  Heart sounds: Normal heart sounds  Pulmonary:      Effort: Pulmonary effort is normal  No respiratory distress  Breath sounds: Normal breath sounds  Abdominal:      General: Bowel sounds are normal       Palpations: Abdomen is soft  Genitourinary:     Comments: Purewick cath, clear quang urine output  Musculoskeletal:         General: No tenderness  Right lower leg: No edema  Left lower leg: No edema  Skin:     General: Skin is warm  Neurological:      General: No focal deficit present  Mental Status: She is alert and oriented to person, place, and time  Psychiatric:         Mood and Affect: Mood normal          Behavior: Behavior normal          Thought Content: Thought content normal          Judgment: Judgment normal      Additional Data:     Lab Results: I have personally reviewed pertinent reports        Results from last 7 days   Lab Units 04/14/21  1451   WBC Thousand/uL 16 30*   HEMOGLOBIN g/dL 13 4   HEMATOCRIT % 39 5*   PLATELETS Thousands/uL 223   NEUTROS PCT % 82*   LYMPHS PCT % 6*   MONOS PCT % 11   EOS PCT % 0     Results from last 7 days   Lab Units 04/14/21  1451   SODIUM mmol/L 131*   POTASSIUM mmol/L 3 6   CHLORIDE mmol/L 98   CO2 mmol/L 22   BUN mg/dL 14   CREATININE mg/dL 0 84   ANION GAP mmol/L 11   CALCIUM mg/dL 9 4   ALBUMIN g/dL 4 0   TOTAL BILIRUBIN mg/dL 1 00   ALK PHOS U/L 49*   ALT U/L 44   AST U/L 266*   GLUCOSE RANDOM mg/dL 120*     Results from last 7 days   Lab Units 04/14/21  1451   INR  1 08             Results from last 7 days   Lab Units 04/14/21  1451   LACTIC ACID mmol/L 1 1   PROCALCITONIN ng/ml <0 05       Imaging: I have personally reviewed pertinent reports  No orders to display       EKG, Pathology, and Other Studies Reviewed on Admission:   · EKG: cxr    Allscripts / Epic Records Reviewed: Yes     ** Please Note: This note has been constructed using a voice recognition system   **

## 2021-04-15 NOTE — ASSESSMENT & PLAN NOTE
· Elevated  in setting of MI   Normal ALT and T bili  · Will order acute hepatitis panel and hep C antibody  · Follow up CMP

## 2021-04-15 NOTE — ASSESSMENT & PLAN NOTE
· Sirs POA with fever 100 5F, tachypnea, tachycardia and leukocytosis  · CXR negative for acute cardiopulmonary disease  · COVID-19 PCR negative  UA negative    · PCT negative, will defer antibiotics  · Repeat procalcitonin negative X2  · Follow-up blood culture  · Per ID, no antibiotics necessary as patient is clinically stable  · Will proceed with CT abdomen pelvis

## 2021-04-15 NOTE — UTILIZATION REVIEW
Initial Clinical Review    Transfer from [facility] ED    Admission: Date/Time/Statement:   Admission Orders (From admission, onward)     Ordered        04/14/21 2054  Inpatient Admission  Once                   Orders Placed This Encounter   Procedures    Inpatient Admission     Standing Status:   Standing     Number of Occurrences:   1     Order Specific Question:   Level of Care     Answer:   Level 2 Stepdown / HOT [14]     Order Specific Question:   Estimated length of stay     Answer:   More than 2 Midnights     Order Specific Question:   Certification     Answer:   I certify that inpatient services are medically necessary for this patient for a duration of greater than two midnights  See H&P and MD Progress Notes for additional information about the patient's course of treatment  Initial Presentation:    66  Y O female initially presented to ED at  [facility] from Urgent  Care with chest pressure and general body aches  Patient appeared short of breath, EKG abnormal, given aspirin and sent  To ED  At  [facility] ED, temp was 100 5, troponin 45 53, tachycardic and given lopressor and IV heparin started  Transferred to [facility] for higher level of care  PMH  Is  MI X 5,  States this presentation was different, did not feel it was cardiac related, COPD, CAD,  tobacco abuse, essential hypertension  Labs show elevated troponin, elevated AST and  Low sodium  CXR  NAD  COVID  19 negative  Met SIRS criteria with temp, tachypnea, tachycardia and leukocytosis  Admit  IP  With NSTEMI,  SIRS, elevated AST, hyponatremia and plan is  Cardiology consult, monitor labs, tele, 2 DE, blood cultures, hold antibiotics for now, O2 to keep sats  > 90 % and continue home meds  Date: 4/15   Day 2:   Cardiology consult  Chest pain with elevated troponin > 40, EKG  NSR, non specific inferolateral T wave changes, no ST elevation  CXR clear  Longstanding tobacco abuse    Symptoms atypical for angina  EKGs  Show no acute changes  Symptoms  Not  Similar to previous MI's  Meets  Sepsis criteria, blood cultures pending, temp  103 on arrival  Need to R/O infection prior to going to cath lab  Need  ID  Input  Loaded with aspirin and plavix, continue  Dual antiplatelet therapy  ID consult  ( 4/15)     Continue to follow  Off  Antibiotics  Follow up final blood cultures  Monitor  Temps and labs  Unclear source of sepsis, maybe  Occult GI source  Clinically stable, non toxic  May be   Noninfectious due to large  MI           Wt Readings from Last 1 Encounters:   04/14/21 84 3 kg (185 lb 13 6 oz)     Additional Vital Signs:   04/15/21 0706  97 3 °F (36 3 °C)Abnormal   106Abnormal   20  118/77  --  96 %  None (Room air)  Lying   04/14/21 2300  103 °F (39 4 °C)Abnormal   102  20  129/80  --  94 %  None (Room air)  Lying   04/14/21 2021  99 1 °F (37 3 °C)  96  21  107/70  88  96 %  None (Room air)  Lying           Pertinent Labs/Diagnostic Test Results:   CXR  ( 4/14)   NAD  EKG  ( 4/14)    Possible age indeterminate inferior wall MI  Results from last 7 days   Lab Units 04/14/21  1457   SARS-COV-2  Negative     Results from last 7 days   Lab Units 04/15/21  0833 04/14/21  1451   WBC Thousand/uL 17 12* 16 30*   HEMOGLOBIN g/dL 12 5 13 4   HEMATOCRIT % 36 6 39 5*   PLATELETS Thousands/uL 183 223   NEUTROS ABS Thousands/µL 13 46* 13 30*         Results from last 7 days   Lab Units 04/15/21  0833 04/14/21  1451   SODIUM mmol/L 135* 131*   POTASSIUM mmol/L 3 3* 3 6   CHLORIDE mmol/L 101 98   CO2 mmol/L 23 22   ANION GAP mmol/L 11 11   BUN mg/dL 14 14   CREATININE mg/dL 0 88 0 84   EGFR ml/min/1 73sq m 64 67   CALCIUM mg/dL 8 5 9 4     Results from last 7 days   Lab Units 04/15/21  0833 04/14/21  1451   AST U/L 336* 266*   ALT U/L 57 44   ALK PHOS U/L 51 49*   TOTAL PROTEIN g/dL 6 6 7 1   ALBUMIN g/dL 2 9* 4 0   TOTAL BILIRUBIN mg/dL 1 32* 1 00         Results from last 7 days   Lab Units 04/15/21  3485 04/14/21  1451   GLUCOSE RANDOM mg/dL 101 120*           Results from last 7 days   Lab Units 04/15/21  0042 04/14/21  2245 04/14/21  1748 04/14/21  1451   TROPONIN I ng/mL >40 00* >40 00* 41 39* 45 53*         Results from last 7 days   Lab Units 04/15/21  0833 04/15/21  0042 04/14/21  1451   PROTIME seconds  --   --  13 9   INR   --   --  1 08   PTT seconds 47* 74* 28         Results from last 7 days   Lab Units 04/14/21  1451   PROCALCITONIN ng/ml <0 05     Results from last 7 days   Lab Units 04/14/21  1451   LACTIC ACID mmol/L 1 1           Results from last 7 days   Lab Units 04/14/21  1615   CLARITY UA  Clear   COLOR UA  Straw   SPEC GRAV UA  <=1 005*   PH UA  7 5   GLUCOSE UA mg/dl Negative   KETONES UA mg/dl Negative   BLOOD UA  2+*   PROTEIN UA mg/dl Negative   NITRITE UA  Negative   BILIRUBIN UA  Negative   UROBILINOGEN UA E U /dl 0 2   LEUKOCYTES UA  Negative   WBC UA /hpf 0-1   RBC UA /hpf 2-4   BACTERIA UA /hpf Occasional   EPITHELIAL CELLS WET PREP /hpf Occasional     Results from last 7 days   Lab Units 04/14/21  1457   INFLUENZA A PCR  Negative   INFLUENZA B PCR  Negative   RSV PCR  Negative           Results from last 7 days   Lab Units 04/14/21  1451   BLOOD CULTURE  Received in Microbiology Lab  Culture in Progress  Received in Microbiology Lab  Culture in Progress               Present on Admission:   NSTEMI (non-ST elevated myocardial infarction) ([facility] )   SIRS (systemic inflammatory response syndrome) ([facility])   COPD (chronic obstructive pulmonary disease) ([place] [address] )   Essential hypertension   High cholesterol   Coronary artery disease involving native coronary artery of native heart with angina pectoris ([facility])   Cigarette smoker   Hyponatremia   Elevated AST (SGOT)      Admitting Diagnosis: NSTEMI (non-ST elevated myocardial infarction) ([facility] ) [I21 4]  Age/Sex: 66 y o  female  Admission Orders:  Scheduled Medications:  aspirin, 81 mg, Oral, Daily  atorvastatin, 40 mg, Oral, Daily With Dinner  clopidogrel, 75 mg, Oral, Daily  levothyroxine, 50 mcg, Oral, Early Morning  losartan, 100 mg, Oral, Daily  metoprolol succinate, 25 mg, Oral, Daily      Continuous IV Infusions:  heparin (porcine), 3-20 Units/kg/hr (Order-Specific), Intravenous, Titrated      PRN Meds:  acetaminophen, 650 mg, Oral, Q4H PRN  aluminum-magnesium hydroxide-simethicone, 30 mL, Oral, Q6H PRN  heparin (porcine), 2,000 Units, Intravenous, Q1H PRN  heparin (porcine), 4,000 Units, Intravenous, Q1H PRN  HYDROmorphone, 0 2 mg, Intravenous, Q4H PRN  nitroglycerin, 0 4 mg, Sublingual, Q5 Min PRN  ondansetron, 4 mg, Intravenous, Q6H PRN  oxyCODONE, 2 5 mg, Oral, Q4H PRN  zolpidem, 10 mg, Oral, HS PRN        IP CONSULT TO CARDIOLOGY  IP CONSULT TO CASE MANAGEMENT  IP CONSULT TO INFECTIOUS DISEASES     [facility] Utilization Review Department  ATTENTION: Please call with any questions or concerns to [phone] and carefully listen to the prompts so that you are directed to the right person  All voicemails are confidential   Lynette Kussmaul all requests for admission clinical reviews, approved or denied determinations and any other requests to dedicated fax number below belonging to the campus where the patient is receiving treatment   List of dedicated fax numbers for the Facilities:  [address] DENIALS (Administrative/Medical Necessity) [phone]   [address] (Maternity/NICU/Pediatrics) [phone]   [address] Baljit Thomas 2875 (Kimberly T[facility]) [address] [place] [address] Gayle Fitzpatrick 1481 [phone] [place] [address] [phone]

## 2021-04-15 NOTE — CONSULTS
Consult - Cardiology   Lynn May 66 y o  female MRN: [id number]  Unit/Bed#: E4 -01 Encounter: [id number]        Reason For Consult: Chest pain                ASSESSMENT:  1  Chest pain, elevated troponin > 40:   -EKG NSR, non-specific inferolateral T-wave changes   -No ST elevation   -CXR clear    2  History of coronary artery disease:   -2004 Inferior wall MI, PCI X 2 to RCA, PCI X 2 to OM1 & OM2   -2011 [facility], patent Circ system, PCI X 1 to dRCA (abnormal stress)    3  Lipid panel July 2020:   -Chol 126, Trig 77, HDL 51, LDL 60    4  Longstanding tobacco abuse, 1 PPD X 30+ years     5  Hypertension, well controlled    7  Sepsis on arrival with fever, white count, and tachycardia    8  Rheumatoid arthritis    9  Mild cardiomyopathy, likely ischemic with EF 40-45% in 2011    PLAN/ DISCUSSION:     · The symptoms she described are atypical for angina  She is currently pain free  Her EKG's do not have acute changes  She tells me this does not feel like her prior MI  · She was febrile on arrival (103) and WBC count is elevated (16 --> 17)  Blood cultures are pending  UA and CXR unremarkable  She met sepsis criteria  · At this point will need to rule out infection prior to sending her to the cath lab  Will repeat and EKG this morning and check echocardiogram today  In the mean time she is comfortable and symptom free  · I'm going to ask for the opinion of infectious disease  Our concern is that if she is septic we would not want to send her for an invasive cardiac procedure of expose her kidneys to contrast dye load    · Aspirin and Plavix have been loaded, continue dual anti-platelet therapy respectively    · Continue high-dose statin and metoprolol XL     History Of Present Illness: This is a 68-year-old female who was previously seeing a cardiologist in [place] but it sounds like she has been lost to follow-up for quite some time    She had an inferior wall MI in 2004 and underwent PCI x4 at [address]  Later in 2011 she had an abnormal nuclear stress test and had repeat cardiac catheterization which showed 70% InStent stenosis of her distal RCA however her previously stented circumflex system was patent  The details of these catheterizations are as outlined above under her assessment  This patient does follow with a primary care physician  She lives at home with her daughter and granddaughter  At the age of 66 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  She has smoked 1 pack daily for 30+ years  Noncardiac history significant for rheumatoid arthritis  Beginning 2 days ago this patient had gradual onset of a plethora of vague symptoms  She felt generally weak and had a poor appetite  She was fatigued and had no energy  She mostly stayed in bed for the entire day  She tells me that when she was laying in bed if she turned onto her left side she would get some substernal chest pressure however this was relieved if she rolled onto her back  I spoke with her daughter over the phone who confirmed all of the symptoms citing that she was concerned about her mom who was not leaving the bed for most of the day Tuesday  Patient tells me she did not sleep well Tuesday night secondary to the above  She does not specifically mention chest pain but will confirm that symptom when asked  On Wednesday apparently the patient's daughter was concerned about the way her mother looked thinking that she may have had the flu  As such she was brought to the [facility] pass  Troponin was 45 on arrival which prompted a transfer to [facility] for higher level of care  This morning the patient is perfectly comfortable and has no physical complaints to me      Past Medical History:        Past Medical History:   Diagnosis Date    Atherosclerosis of CABG, unsp, w oth angina pectoris (HCC)     Cellulitis     Essential hypertension     Hypothyroidism     Myocardial infarction ([facility] )     Rheumatoid arthritis ([facility] )       Past Surgical History:   Procedure Laterality Date    CYSTOSCOPY      HYSTERECTOMY      total    OTHER SURGICAL HISTORY      heart stent    TONSILLECTOMY          Allergy:        Allergies   Allergen Reactions    Clarithromycin Other (See Comments)     Other reaction(s): tongue lesions and burning  BIAXIN- tongue cracks & gets bumps      Penicillins Other (See Comments)     tongue cracks & gets bumps  Other reaction(s): tongues lesions and burning         Medications:       Prior to Admission medications    Medication Sig Start Date End Date Taking?  Authorizing Provider   aspirin (ECOTRIN LOW STRENGTH) 81 mg EC tablet Take 81 mg by mouth daily   Yes Historical Provider, MD   Baricitinib (Olumiant) 2 MG TABS Take 2 mg by mouth daily in the early morning   Yes Historical Provider, MD   levothyroxine 50 mcg tablet Take 1 tablet (50 mcg total) by mouth daily 1/20/21  Yes LUCIA Torres   losartan-hydrochlorothiazide (HYZAAR) 100-25 MG per tablet Take 1 tablet by mouth daily   Yes Historical Provider, MD   methotrexate 2 5 MG tablet Take 2 5 mg by mouth As directed   Yes Historical Provider, MD   simvastatin (ZOCOR) 80 mg tablet Take 1 tablet (80 mg total) by mouth daily 10/30/19  Yes LUCIA Lacey   zolpidem (AMBIEN) 10 mg tablet Take 1 tablet (10 mg total) by mouth daily at bedtime as needed for sleep 8/6/20  Yes LUCIA Morgan   metoprolol succinate (TOPROL-XL) 50 mg 24 hr tablet Take 0 5 tablets (25 mg total) by mouth daily  Patient not taking: Reported on 4/13/2021 9/24/20   LUCIA Lacey   nitroglycerin (NITROSTAT) 0 4 mg SL tablet Place 1 tablet (0 4 mg total) under the tongue every 5 (five) minutes as needed for chest pain 10/3/18   Faustino Pelayo MD       Family History:     Family History   Problem Relation Age of Onset    Heart attack Mother     Heart attack Brother     Heart attack Brother Social History:       Social History     Socioeconomic History    Marital status:      Spouse name: Not on file    Number of children: Not on file    Years of education: Not on file    Highest education level: Not on file   Occupational History    Occupation: Home health aide   Social Needs    Financial resource strain: Not on file    Food insecurity     Worry: Not on file     Inability: Not on file    Transportation needs     Medical: Not on file     Non-medical: Not on file   Tobacco Use    Smoking status: Heavy Tobacco Smoker     Packs/day: 1 00     Types: Cigarettes    Smokeless tobacco: Never Used    Tobacco comment: compulsive smoker-wont quit   Substance and Sexual Activity    Alcohol use: Yes     Frequency: Monthly or less     Comment: rarely    Drug use: No    Sexual activity: Not Currently   Lifestyle    Physical activity     Days per week: Not on file     Minutes per session: Not on file    Stress: Not on file   Relationships    Social connections     Talks on phone: Not on file     Gets together: Not on file     Attends [employer] service: Not on file     Active member of club or organization: Not on file     Attends meetings of clubs or organizations: Not on file     Relationship status: Not on file    Intimate partner violence     Fear of current or ex partner: Not on file     Emotionally abused: Not on file     Physically abused: Not on file     Forced sexual activity: Not on file   Other Topics Concern    Not on file   Social History Narrative    Daily caffeine use- 3-4 cups of coffee       ROS:  Symptoms per HPI  Remainder review of systems is negative    Exam:  General:  alert, oriented and in no distress, cooperative   Somewhat of a difficult historian  Head: Normocephalic, atraumatic  Eyes:  EOMI  Pupils - equal, round, reactive to accomodation  No icterus  Normal Conjunctiva     Oropharynx: moist and normal-appearing mucosa  Neck: supple, symmetrical, trachea midline and no JVD  Heart:  RRR, No: murmer, rub or gallop, S1 & S2 normal   Respiratory effort / Chest Inspection: unlabored  Lungs:  normal air entry, lungs clear to auscultation and no rales, rhonchi or wheezing   Abdomen: flat, normal findings: bowel sounds normal and soft, non-tender  Lower Limbs:  no pitting edema  Pulses[de-identified]  RLE - DP: present 2+                 LLE - DP: present 2+  Musculoskeletal: ROM grossly normal        DATA:      ECG:      Normal sinus rhythm  Nonspecific lateral changes  No obvious ischemia                 Telemetry: tachycardic  HR                Weights: Wt Readings from Last 3 Encounters:   04/14/21 84 3 kg (185 lb 13 6 oz)   04/14/21 81 6 kg (180 lb)   04/14/21 81 6 kg (180 lb)   , Body mass index is 30 93 kg/m²           Lab Studies:    Results from last 7 days   Lab Units 04/15/21  0042 04/14/21  2245 04/14/21  1748   TROPONIN I ng/mL >40 00* >40 00* 41 39*          Results from last 7 days   Lab Units 04/14/21  1451   WBC Thousand/uL 16 30*   HEMOGLOBIN g/dL 13 4   HEMATOCRIT % 39 5*   PLATELETS Thousands/uL 223   ,   Results from last 7 days   Lab Units 04/14/21  1451   POTASSIUM mmol/L 3 6   CHLORIDE mmol/L 98   CO2 mmol/L 22   BUN mg/dL 14   CREATININE mg/dL 0 84   CALCIUM mg/dL 9 4   ALK PHOS U/L 49*   ALT U/L 44   AST U/L 266*

## 2021-04-15 NOTE — OCCUPATIONAL THERAPY NOTE
OCCUPATIONAL THERAPY CANCELLATION     OT orders received and chart reviewed  Pt w/ Troponin 40 0 this morning  Will await cardiology consult  Plan to f/u for evaluation when able and medically appropriate       Keira Schumacher MS, OTR/L

## 2021-04-16 PROBLEM — E87.6 HYPOKALEMIA: Status: ACTIVE | Noted: 2021-01-01

## 2021-04-16 NOTE — RAPID RESPONSE
Rapid Response Note  Chuy Pedraza 66 y o  female MRN: [id number]  Unit/Bed#: ICU 10 Encounter: [id number]    Rapid Response Notification(s):   Response called date/time:  4/16/2021 6:36 PM  Response team arrival date/time:  4/16/2021 6:37 PM  Response end date/time:  4/16/2021 7:13 PM  Rapid response location:  Stepdown unit  Primary reason for rapid response call:  Acute change in heart rhythm and acute change in O2 sat    Rapid Response Intervention(s):   Airway:  Suction and endotracheal intubation  Breathing:  Assisted ventilation  Circulation:  ACLS protocol  Fluids administered:  Normal saline  Medications administered:  Amiodarone, calcium, dopamine, epinephrine and sodium bicarbonate  Line placement:  Place central line       Background/Situation:   Chuy Pedraza is a 66 y o  female who presented to [place] [address] on 14 April 2021 with an NSTEMI, sirs, COPD, tobacco dependence, hypertension, elevated lipids and known coronary artery disease with history of myocardial infarction and PTCA to unknown vessels  Today the patient was post cardiac catheterization from right femoral approach  Patient was returned to the telemetry unit where she developed a atrial fibrillation with a rapid ventricular response  Patient was hypotensive and a subsequent rapid response was called  Upon my arrival to the room the patient is barely responsive on a non-rebreather mask  Blood pressure is 50 systolic manually  Saturation of peripheral oxygen was in the 70s  I elected to transport the patient to the intensive care unit immediately with the intention of intubating her immediately  Upon arrival to the ICU the patient began to code  ACLS was initiated  The patient was intubated  A central line was placed  She had multiple rounds of epinephrine, and bicarb  CPR was ongoing  Additionally, she was given amiodarone IV and calcium  Unfortunately, we were unable to obtain ROSC    The patient was declared at 19:13  Review of Systems   Unable to perform ROS: Patient unresponsive       Objective:   Vitals:    04/16/21 1300 04/16/21 1415 04/16/21 1515 04/16/21 1615   BP: 110/55 119/64 110/72 108/72   BP Location: Left arm Left arm Right arm    Pulse: 101 (!) 121 (!) 123 (!) 125   Resp:   18    Temp:       TempSrc:       SpO2:   95%    Weight:       Height:         Physical Exam  Vitals signs reviewed  Constitutional:       General: She is in acute distress  Appearance: She is ill-appearing  HENT:      Head: Normocephalic and atraumatic  Nose: Nose normal       Mouth/Throat:      Mouth: Mucous membranes are moist       Pharynx: Oropharynx is clear  Eyes:      Extraocular Movements: Extraocular movements intact  Conjunctiva/sclera: Conjunctivae normal       Pupils: Pupils are equal, round, and reactive to light  Neck:      Musculoskeletal: Neck supple  Cardiovascular:      Rate and Rhythm: Tachycardia present  Pulmonary:      Breath sounds: Wheezing present  Abdominal:      General: Bowel sounds are normal       Palpations: Abdomen is soft  Skin:     General: Skin is warm and dry  Neurological:      Mental Status: She is disoriented  Assessment:   · Atrial fibrillation with rapid ventricular response  · Asystole  · PEA    Plan:   · ACLS was initiated, the patient was intubated and ACLS drugs were administered or     Rapid Response Outcome:   Condition:  In unstable condition  Progression:  Worsening  Transfer:  Transfer to ICU  Primary service notified of transfer: Yes    Handoff report: by phone    Code Status: Level 1 (Full Code)    Comments:  Patient became a code blue and passed away    Family notified of transfer: {YES notified of patient's death  Family member contacted:  Daughter     Portions of the record may have been created with voice recognition software    Occasional wrong word or "sound a like" substitutions may have occurred due to the inherent limitations of voice recognition software  Read the chart carefully and recognize, using context, where substitutions have occurred      LUCIA Hu

## 2021-04-16 NOTE — PROGRESS NOTES
Progress Note - Cardiology   César Rosa 66 y o  female MRN: [id number]  Unit/Bed#: E4 -01 Encounter: [id number]    Assessment:  1  Acute NSTEMI  2  Cardiac catheterization today with 99% tubular proximal circumflex stenosis, most likely culprit lesion treated with 3 5 mm by 22 mm FRANCES  Proximal LAD 60% proximal, proximal RCA 50% discrete InStent restenosis, RCA-PDA 50% stenosis  3  Patient on aspirin and prasugrel  4  History of prior inferior wall myocardial infarction with PCI of the RCA x2 and PCI of OM 1 and OM 2 in 2004, PCI of distal RCA in 2011  5  Hypertension  6  Hyperlipidemia  7  Longstanding tobacco use 1 pack daily for 30+ years  8  Admitting picture appeared to be sepsis with fever, white count on elevation and tachypnea but cultures negative, may have been affects of myocardial infarction  9  Rheumatoid arthritis  10  Echocardiogram yesterday demonstrated severe LV dysfunction with EF 35%, inferior and inferior lateral hypokinesis to akinesis, mild left ventricular enlargement, grade 2 left ventricular diastolic dysfunction (pseudonormalization) with mildly elevated left atrial pressures    Plan:  1  If patient is stable, she could probably be discharged tomorrow      Interval history:  Patient underwent cardiac catheterization and coronary intervention  She tolerated procedure well        Vitals: /73 (BP Location: Left arm)   Pulse 84   Temp 99 °F (37 2 °C) (Temporal)   Resp 18   Ht 5' 5" (1 651 m)   Wt 84 3 kg (185 lb 13 6 oz)   SpO2 93%   BMI 30 93 kg/m²   Vitals:    04/14/21 2021   Weight: 84 3 kg (185 lb 13 6 oz)     Orthostatic Blood Pressures      Most Recent Value   Blood Pressure  106/73 filed at 04/16/2021 1130   Patient Position - Orthostatic VS  Lying filed at 04/16/2021 1130            Intake/Output Summary (Last 24 hours) at 4/16/2021 1146  Last data filed at 4/16/2021 1019  Gross per 24 hour   Intake 1692 16 ml   Output 1100 ml   Net 592 16 ml       Invasive Devices     Peripheral Intravenous Line            Peripheral IV 04/16/21 Distal;Dorsal (posterior); Right Forearm less than 1 day          Drain            External Urinary Catheter 1 day

## 2021-04-16 NOTE — PLAN OF CARE
Problem: PAIN - ADULT  Goal: Verbalizes/displays adequate comfort level or baseline comfort level  Description: Interventions:  - Encourage patient to monitor pain and request assistance  - Assess pain using appropriate pain scale  - Administer analgesics based on type and severity of pain and evaluate response  - Implement non-pharmacological measures as appropriate and evaluate response  - Consider cultural and social influences on pain and pain management  - Notify physician/advanced practitioner if interventions unsuccessful or patient reports new pain  Outcome: Progressing     Problem: INFECTION - ADULT  Goal: Absence or prevention of progression during hospitalization  Description: INTERVENTIONS:  - Assess and monitor for signs and symptoms of infection  - Monitor lab/diagnostic results  - Monitor all insertion sites, i e  indwelling lines, tubes, and drains  - Monitor endotracheal if appropriate and nasal secretions for changes in amount and color  - [place] appropriate cooling/warming therapies per order  - Administer medications as ordered  - Instruct and encourage patient and family to use good hand hygiene technique  - Identify and instruct in appropriate isolation precautions for identified infection/condition  Outcome: Progressing     Problem: SAFETY ADULT  Goal: Patient will remain free of falls  Description: INTERVENTIONS:  - Assess patient frequently for physical needs  -  Identify cognitive and physical deficits and behaviors that affect risk of falls    -  [place] fall precautions as indicated by assessment   - Educate patient/family on patient safety including physical limitations  - Instruct patient to call for assistance with activity based on assessment  - Modify environment to reduce risk of injury  - Consider OT/PT consult to assist with strengthening/mobility  Outcome: Progressing  Goal: Maintain or return to baseline ADL function  Description: INTERVENTIONS:  -  Assess patient's ability to carry out ADLs; assess patient's baseline for ADL function and identify physical deficits which impact ability to perform ADLs (bathing, care of mouth/teeth, toileting, grooming, dressing, etc )  - Assess/evaluate cause of self-care deficits   - Assess range of motion  - Assess patient's mobility; develop plan if impaired  - Assess patient's need for assistive devices and provide as appropriate  - Encourage maximum independence but intervene and supervise when necessary  - Involve family in performance of ADLs  - Assess for home care needs following discharge   - Consider OT consult to assist with ADL evaluation and planning for discharge  - Provide patient education as appropriate  Outcome: Progressing  Goal: Maintain or return mobility status to optimal level  Description: INTERVENTIONS:  - Assess patient's baseline mobility status (ambulation, transfers, stairs, etc )    - Identify cognitive and physical deficits and behaviors that affect mobility  - Identify mobility aids required to assist with transfers and/or ambulation (gait belt, sit-to-stand, lift, walker, cane, etc )  - [place] fall precautions as indicated by assessment  - Record patient progress and toleration of activity level on Mobility SBAR; progress patient to next Phase/Stage  - Instruct patient to call for assistance with activity based on assessment  - Consider rehabilitation consult to assist with strengthening/weightbearing, etc   Outcome: Progressing     Problem: DISCHARGE PLANNING  Goal: Discharge to home or other facility with appropriate resources  Description: INTERVENTIONS:  - Identify barriers to discharge w/patient and caregiver  - Arrange for needed discharge resources and transportation as appropriate  - Identify discharge learning needs (meds, wound care, etc )  - Arrange for interpretive services to assist at discharge as needed  - Refer to Case Management Department for coordinating discharge planning if the patient needs post-hospital services based on physician/advanced practitioner order or complex needs related to functional status, cognitive ability, or social support system  Outcome: Progressing     Problem: Knowledge Deficit  Goal: Patient/family/caregiver demonstrates understanding of disease process, treatment plan, medications, and discharge instructions  Description: Complete learning assessment and assess knowledge base    Interventions:  - Provide teaching at level of understanding  - Provide teaching via preferred learning methods  Outcome: Progressing     Problem: CARDIOVASCULAR - ADULT  Goal: Maintains optimal cardiac output and hemodynamic stability  Description: INTERVENTIONS:  - Monitor I/O, vital signs and rhythm  - Monitor for S/S and trends of decreased cardiac output  - Administer and titrate ordered vasoactive medications to optimize hemodynamic stability  - Assess quality of pulses, skin color and temperature  - Assess for signs of decreased coronary artery perfusion  - Instruct patient to report change in severity of symptoms  Outcome: Progressing  Goal: Absence of cardiac dysrhythmias or at baseline rhythm  Description: INTERVENTIONS:  - Continuous cardiac monitoring, vital signs, obtain 12 lead EKG if ordered  - Administer antiarrhythmic and heart rate control medications as ordered  - Monitor electrolytes and administer replacement therapy as ordered  Outcome: Progressing     Problem: Prexisting or High Potential for Compromised Skin Integrity  Goal: Skin integrity is maintained or improved  Description: INTERVENTIONS:  - Identify patients at risk for skin breakdown  - Assess and monitor skin integrity  - Assess and monitor nutrition and hydration status  - Monitor labs   - Assess for incontinence   - Turn and reposition patient  - Assist with mobility/ambulation  - Relieve pressure over bony prominences  - Avoid friction and shearing  - Provide appropriate hygiene as needed including keeping skin clean and dry  - Evaluate need for skin moisturizer/barrier cream  - Collaborate with interdisciplinary team   - Patient/family teaching  - Consider wound care consult   Outcome: Progressing     Problem: Potential for Falls  Goal: Patient will remain free of falls  Description: INTERVENTIONS:  - Assess patient frequently for physical needs  -  Identify cognitive and physical deficits and behaviors that affect risk of falls    -  [place] fall precautions as indicated by assessment   - Educate patient/family on patient safety including physical limitations  - Instruct patient to call for assistance with activity based on assessment  - Modify environment to reduce risk of injury  - Consider OT/PT consult to assist with strengthening/mobility  Outcome: Progressing

## 2021-04-16 NOTE — PLAN OF CARE
Problem: PAIN - ADULT  Goal: Verbalizes/displays adequate comfort level or baseline comfort level  Description: Interventions:  - Encourage patient to monitor pain and request assistance  - Assess pain using appropriate pain scale  - Administer analgesics based on type and severity of pain and evaluate response  - Implement non-pharmacological measures as appropriate and evaluate response  - Consider cultural and social influences on pain and pain management  - Notify physician/advanced practitioner if interventions unsuccessful or patient reports new pain  Outcome: Progressing     Problem: INFECTION - ADULT  Goal: Absence or prevention of progression during hospitalization  Description: INTERVENTIONS:  - Assess and monitor for signs and symptoms of infection  - Monitor lab/diagnostic results  - Monitor all insertion sites, i e  indwelling lines, tubes, and drains  - Monitor endotracheal if appropriate and nasal secretions for changes in amount and color  - [place] appropriate cooling/warming therapies per order  - Administer medications as ordered  - Instruct and encourage patient and family to use good hand hygiene technique  - Identify and instruct in appropriate isolation precautions for identified infection/condition  Outcome: Progressing     Problem: SAFETY ADULT  Goal: Patient will remain free of falls  Description: INTERVENTIONS:  - Assess patient frequently for physical needs  -  Identify cognitive and physical deficits and behaviors that affect risk of falls    -  [place] fall precautions as indicated by assessment   - Educate patient/family on patient safety including physical limitations  - Instruct patient to call for assistance with activity based on assessment  - Modify environment to reduce risk of injury  - Consider OT/PT consult to assist with strengthening/mobility  Outcome: Progressing  Goal: Maintain or return to baseline ADL function  Description: INTERVENTIONS:  -  Assess patient's ability to carry out ADLs; assess patient's baseline for ADL function and identify physical deficits which impact ability to perform ADLs (bathing, care of mouth/teeth, toileting, grooming, dressing, etc )  - Assess/evaluate cause of self-care deficits   - Assess range of motion  - Assess patient's mobility; develop plan if impaired  - Assess patient's need for assistive devices and provide as appropriate  - Encourage maximum independence but intervene and supervise when necessary  - Involve family in performance of ADLs  - Assess for home care needs following discharge   - Consider OT consult to assist with ADL evaluation and planning for discharge  - Provide patient education as appropriate  Outcome: Progressing  Goal: Maintain or return mobility status to optimal level  Description: INTERVENTIONS:  - Assess patient's baseline mobility status (ambulation, transfers, stairs, etc )    - Identify cognitive and physical deficits and behaviors that affect mobility  - Identify mobility aids required to assist with transfers and/or ambulation (gait belt, sit-to-stand, lift, walker, cane, etc )  - [place] fall precautions as indicated by assessment  - Record patient progress and toleration of activity level on Mobility SBAR; progress patient to next Phase/Stage  - Instruct patient to call for assistance with activity based on assessment  - Consider rehabilitation consult to assist with strengthening/weightbearing, etc   Outcome: Progressing     Problem: DISCHARGE PLANNING  Goal: Discharge to home or other facility with appropriate resources  Description: INTERVENTIONS:  - Identify barriers to discharge w/patient and caregiver  - Arrange for needed discharge resources and transportation as appropriate  - Identify discharge learning needs (meds, wound care, etc )  - Arrange for interpretive services to assist at discharge as needed  - Refer to Case Management Department for coordinating discharge planning if the patient needs post-hospital services based on physician/advanced practitioner order or complex needs related to functional status, cognitive ability, or social support system  Outcome: Progressing     Problem: Knowledge Deficit  Goal: Patient/family/caregiver demonstrates understanding of disease process, treatment plan, medications, and discharge instructions  Description: Complete learning assessment and assess knowledge base    Interventions:  - Provide teaching at level of understanding  - Provide teaching via preferred learning methods  Outcome: Progressing     Problem: CARDIOVASCULAR - ADULT  Goal: Maintains optimal cardiac output and hemodynamic stability  Description: INTERVENTIONS:  - Monitor I/O, vital signs and rhythm  - Monitor for S/S and trends of decreased cardiac output  - Administer and titrate ordered vasoactive medications to optimize hemodynamic stability  - Assess quality of pulses, skin color and temperature  - Assess for signs of decreased coronary artery perfusion  - Instruct patient to report change in severity of symptoms  Outcome: Progressing  Goal: Absence of cardiac dysrhythmias or at baseline rhythm  Description: INTERVENTIONS:  - Continuous cardiac monitoring, vital signs, obtain 12 lead EKG if ordered  - Administer antiarrhythmic and heart rate control medications as ordered  - Monitor electrolytes and administer replacement therapy as ordered  Outcome: Progressing     Problem: Prexisting or High Potential for Compromised Skin Integrity  Goal: Skin integrity is maintained or improved  Description: INTERVENTIONS:  - Identify patients at risk for skin breakdown  - Assess and monitor skin integrity  - Assess and monitor nutrition and hydration status  - Monitor labs   - Assess for incontinence   - Turn and reposition patient  - Assist with mobility/ambulation  - Relieve pressure over bony prominences  - Avoid friction and shearing  - Provide appropriate hygiene as needed including keeping skin clean and dry  - Evaluate need for skin moisturizer/barrier cream  - Collaborate with interdisciplinary team   - Patient/family teaching  - Consider wound care consult   Outcome: Progressing     Problem: Potential for Falls  Goal: Patient will remain free of falls  Description: INTERVENTIONS:  - Assess patient frequently for physical needs  -  Identify cognitive and physical deficits and behaviors that affect risk of falls    -  [place] fall precautions as indicated by assessment   - Educate patient/family on patient safety including physical limitations  - Instruct patient to call for assistance with activity based on assessment  - Modify environment to reduce risk of injury  - Consider OT/PT consult to assist with strengthening/mobility  Outcome: Progressing

## 2021-04-16 NOTE — OCCUPATIONAL THERAPY NOTE
OCCUPATIONAL THERAPY CANCELLATION     OT orders received and chart reviewed  Pt continues w/ abnormal labs  RN reports scheduled for cardiac cath at 10 am  Will f/u and see pt as able and appropriate       Zaid Anderson MS, OTR/L

## 2021-04-16 NOTE — PHYSICAL THERAPY NOTE
PHYSICAL THERAPY NOTE          Patient Name: Sarah Scruggs  ZYONJ'B Date: 4/16/2021      PT orders received  Chart reviewed  Attempted to see pt however per RN pt continues w/ abnormal labs and will be going for a cardiac cath at 10 am  Will continue to follow as appropriate       Nga Rodriguez

## 2021-04-16 NOTE — ASSESSMENT & PLAN NOTE
· Transferred from [facility] for NSTEMI  Troponin 45 5, EKG: nonspecific ST and T wave abnormality, possible infarct  Continue to trend troponin and EKG  · CXR negative for acute cardiopulmonary disease  · Started on heparin drip ,now stopped s/p cath    ·  Continue daily ASA 81mg, Prasurgel 10mg daily started  · Maintained on Toprol 25mg daily  · PRN NTG if chest pain   · Provide supplemental oxygen if O2 sat less than 90% or patient is in respiratory distress  · Continuous telemetry monitoring  · Cardiology follows  · Cardiac cath on 4/16 revealed:  99% tubular proximal circumflex stenosis, most likely culprit lesion treated with 3 5 mm by 22 mm FRANCES    Proximal LAD 60% proximal, proximal RCA 50% discrete InStent restenosis, RCA-PDA 50% stenosis

## 2021-04-16 NOTE — NURSING NOTE
Pt heart rate trending upward - 140's message to Dr Vonita Boeck  /63 Pt was alert and oriented had just eaten supper  Went back into the room to check on pt  - and she has one leg over the side and would not answer me when I called her name  Rapid response was called  pulse still present  - however respirations labored and color kim and she was "very stiff" Placed on O2 mask  Unable to obtain auto BP  - manual BP barely heard at 50/0  Fluids opened    Pt transferred to ICU

## 2021-04-16 NOTE — ASSESSMENT & PLAN NOTE
· Sirs POA with fever 100 5F, tachypnea, tachycardia and leukocytosis  · CXR negative for acute cardiopulmonary disease  · COVID-19 PCR negative  UA negative    · PCT negative, will defer antibiotics  · Repeat procalcitonin negative X2  · Negative blood culture so far  · Per ID, no antibiotics necessary as patient is clinically stable

## 2021-04-16 NOTE — PROGRESS NOTES
[facility]  Progress Note - He Lorenzo 1945, 66 y o  female MRN: [id number]  Unit/Bed#: E4 -01 Encounter: [id number]  Primary Care Provider: LUCIA Garcia   Date and time admitted to hospital: 4/14/2021  8:05 PM  Addendum:  Patient developed rapid AFib with RVR in the late afternoon  Cardiology started on amiodarone drip  Patient's daughter at the bedside updated  Noted a large hematoma on the left forearm of the patient  Will hold all anticoagulants at this time  Hypokalemia  Assessment & Plan  K of 3 2  Provided with IV replacement    Elevated AST (SGOT)  Assessment & Plan  · Elevated  in setting of MI  Normal ALT and T bili      Hyponatremia  Assessment & Plan  · Na 131 on admission, now 134  On Hyzaar  Will hold HCTZ  · Repeat a m  BMP    Cigarette smoker  Assessment & Plan  · Smokes 1 pack per day  Declined nicotine TD patch  · Tobacco cessation education    SIRS (systemic inflammatory response syndrome) ([facility])  Assessment & Plan  · Sirs POA with fever 100 5F, tachypnea, tachycardia and leukocytosis  · CXR negative for acute cardiopulmonary disease  · COVID-19 PCR negative  UA negative  · PCT negative, will defer antibiotics  · Repeat procalcitonin negative X2  · Negative blood culture so far  · Per ID, no antibiotics necessary as patient is clinically stable      Rheumatoid arthritis ([facility] [address] )  Assessment & Plan  On methotrexate and Olumiant    High cholesterol  Assessment & Plan  · Continue statin    Essential hypertension  Assessment & Plan  · Maintained on Losartan-HCTZ and Toprol  · HCTZ on hold in setting of hyponatremia  Continue losartan & Toprol    COPD (chronic obstructive pulmonary disease) ([facility])  Assessment & Plan  · No acute exacerbation, not on maintenance inhaler    Coronary artery disease involving native coronary artery of native heart with angina pectoris ([facility])  Assessment & Plan  · CAD h/o MI and stents    Maintained on ASA, BB and statin    * NSTEMI (non-ST elevated myocardial infarction) [facility])  Assessment & Plan  · Transferred from [facility] [place] operated by [facility] for NSTEMI  Troponin 45 5, EKG: nonspecific ST and T wave abnormality, possible infarct  Continue to trend troponin and EKG  · CXR negative for acute cardiopulmonary disease  · Started on heparin drip ,now stopped s/p cath    ·  Continue daily ASA 81mg, Prasurgel 10mg daily started  · Maintained on Toprol 25mg daily  · PRN NTG if chest pain   · Provide supplemental oxygen if O2 sat less than 90% or patient is in respiratory distress  · Continuous telemetry monitoring  · Cardiology follows  · Cardiac cath on  revealed:  99% tubular proximal circumflex stenosis, most likely culprit lesion treated with 3 5 mm by 22 mm FRANCES  Proximal LAD 60% proximal, proximal RCA 50% discrete InStent restenosis, RCA-PDA 50% stenosis        VTE Pharmacologic Prophylaxis:   Pharmacologic: Heparin  Mechanical VTE Prophylaxis in Place: Yes    Patient Centered Rounds: I have performed bedside rounds with nursing staff today  Discussions with Specialists or Other Care Team Provider: cardiology    Education and Discussions with Family / Patient: patient    Time Spent for Care: 20 minutes  More than 50% of total time spent on counseling and coordination of care as described above  Current Length of Stay: 2 day(s)    Current Patient Status: Inpatient   Certification Statement: The patient will continue to require additional inpatient hospital stay due to cardiac cath recovery    Discharge Plan: 24 hrs    Code Status: Level 1 - Full Code      Subjective:   Patient was seen and examined  She just came back from cardiac catheterization  She is awake  He denies any pain      Objective:     Vitals:   Temp (24hrs), Av 6 °F (37 °C), Min:98 °F (36 7 °C), Max:99 °F (37 2 °C)    Temp:  [98 °F (36 7 °C)-99 °F (37 2 °C)] 99 °F (37 2 °C)  HR:  [] 123  Resp:  [16-18] 18  BP: ()/(55-80) 110/73  SpO2:  [92 %-96 %] 95 %  Body mass index is 30 93 kg/m²  Input and Output Summary (last 24 hours): Intake/Output Summary (Last 24 hours) at 4/16/2021 1515  Last data filed at 4/16/2021 1019  Gross per 24 hour   Intake 1212 16 ml   Output 1100 ml   Net 112 16 ml       Physical Exam:     Physical Exam  Constitutional:       Appearance: She is well-developed  Cardiovascular:      Rate and Rhythm: Normal rate and regular rhythm  Pulmonary:      Effort: Pulmonary effort is normal       Breath sounds: Normal breath sounds  Abdominal:      Palpations: Abdomen is soft  Skin:     General: Skin is warm  Findings: No erythema  Neurological:      Mental Status: She is alert  Psychiatric:         Behavior: Behavior normal            Additional Data:     Labs:    Results from last 7 days   Lab Units 04/16/21  0455   WBC Thousand/uL 11 59*   HEMOGLOBIN g/dL 12 0   HEMATOCRIT % 34 9   PLATELETS Thousands/uL 179   NEUTROS PCT % 76*   LYMPHS PCT % 11*   MONOS PCT % 12   EOS PCT % 1     Results from last 7 days   Lab Units 04/16/21  0455 04/15/21  0833   SODIUM mmol/L 134* 135*   POTASSIUM mmol/L 3 2* 3 3*   CHLORIDE mmol/L 100 101   CO2 mmol/L 23 23   BUN mg/dL 16 14   CREATININE mg/dL 0 80 0 88   ANION GAP mmol/L 11 11   CALCIUM mg/dL 8 6 8 5   ALBUMIN g/dL  --  2 9*   TOTAL BILIRUBIN mg/dL  --  1 32*   ALK PHOS U/L  --  51   ALT U/L  --  57   AST U/L  --  336*   GLUCOSE RANDOM mg/dL 90 101     Results from last 7 days   Lab Units 04/14/21  1451   INR  1 08         Results from last 7 days   Lab Units 04/15/21  0833   HEMOGLOBIN A1C % 5 5     Results from last 7 days   Lab Units 04/15/21  0833 04/14/21  1451   LACTIC ACID mmol/L  --  1 1   PROCALCITONIN ng/ml 0 14 <0 05           * I Have Reviewed All Lab Data Listed Above  * Additional Pertinent Lab Tests Reviewed:  All Labs Within Last 24 Hours Reviewed    Imaging:      Recent Cultures (last 7 days):     Results from last 7 days   Lab Units 04/14/21  1451   BLOOD CULTURE  No Growth at 24 hrs  No Growth at 24 hrs  Last 24 Hours Medication List:   Current Facility-Administered Medications   Medication Dose Route Frequency Provider Last Rate    acetaminophen  650 mg Oral Q4H PRN Evins Aurora, CRNP      aluminum-magnesium hydroxide-simethicone  30 mL Oral Q6H PRN Evins Aurora, CRNP      [START ON 4/17/2021] amiodarone  200 mg Oral BID With Meals Uzair Neal MD      amiodarone  400 mg Oral Once Uzair Neal MD      aspirin  81 mg Oral Daily Evins Aurora, CRNP      atorvastatin  40 mg Oral Daily With [employer], CRNP      HYDROmorphone  0 2 mg Intravenous Q4H PRN Evins Aurora, CRNP      levothyroxine  50 mcg Oral Early Morning Evins Aurora, CRNP      losartan  100 mg Oral Daily Evins Aurora, CRNP      methotrexate  2 5 mg Oral Weekly Taiwo Rojas MD      metoprolol succinate  25 mg Oral Daily Evins Aurora, CRNP      nitroglycerin  1 inch Topical Q6H Scott Soler MD      nitroglycerin  0 4 mg Sublingual Q5 Min PRN Evins Aurora, CRNP      ondansetron  4 mg Intravenous Q6H PRN Evins Aurora, CRNP      oxyCODONE  2 5 mg Oral Q4H PRN Evins Aurora, CRNP      prasugrel  10 mg Oral Daily Scott Soler MD      sodium chloride  150 mL/hr Intravenous Continuous Soctt Soler  mL/hr (04/16/21 1146)    zolpidem  10 mg Oral HS PRN Evins Aurora, CRNP          Today, Patient Was Seen By: Taiwo Rojas MD    ** Please Note: Dictation voice to text software may have been used in the creation of this document   **

## 2021-04-16 NOTE — PROGRESS NOTES
Progress Note - Infectious Disease   Alicia Mckeon 66 y o  female MRN: [id number]  Unit/Bed#: E4 -01 Encounter: [id number]      Impression/Recommendations:  1  Sepsis, POA  Tachycarida, WBC, later fever  Unclear source  ROS and exam benign  UA, Flu/RSV/COVID PCR, procalcitonin, blood cultures, CXR, CT C/A/P negative  AST elevated but may be cardiac in origin in setting of #2  Consider self-limited viral infection  Consider noninfectious due to large MI  Clinically stable and non-toxic  Rec:  ? Continue to follow closely off antibiotics  ? Follow temperatures closely  ? Supportive care as per the primary service     2  NSTEMI  Presented with chest pressure, elevated troponin  Status post cath/FRANCES  Rec:  ? Cardiology follow-up ongoing    3   RA  On baricitinib and methotrexate      The patient is stable from an ID standpoint  We will sign off for now  Please call with new questions      Antibiotics:  None since admission    Subjective:  Patient seen on AM rounds  Denies fevers, chills, sweats, nausea, vomiting, or diarrhea  Tired because "there's nothing to do around here"  24 Hour Events:  No documented fevers, chills, sweats, nausea, vomiting, or diarrhea  Had cardiac cath this AM, got FRANCES  Objective:  Vitals:  Temp:  [98 °F (36 7 °C)-99 °F (37 2 °C)] 99 °F (37 2 °C)  HR:  [] 121  Resp:  [16-18] 18  BP: ()/(55-80) 119/64  SpO2:  [92 %-96 %] 93 %  Temp (24hrs), Av 6 °F (37 °C), Min:98 °F (36 7 °C), Max:99 °F (37 2 °C)  Current: Temperature: 99 °F (37 2 °C)    Physical Exam:   General:  No acute distress  Psychiatric:  Sleeping but easily arousable  Pulmonary:  Normal respiratory excursion without accessory muscle use  Abdomen:  Soft, nontender  Extremities:  No edema  Skin:  No rashes    Lab Results:  I have personally reviewed pertinent labs    Results from last 7 days   Lab Units 21  0455 04/15/21  0833 21  1451   POTASSIUM mmol/L 3 2* 3 3* 3 6   CHLORIDE mmol/L 100 101 98   CO2 mmol/L 23 23 22   BUN mg/dL 16 14 14   CREATININE mg/dL 0 80 0 88 0 84   EGFR ml/min/1 73sq m 71 64 67   CALCIUM mg/dL 8 6 8 5 9 4   AST U/L  --  336* 266*   ALT U/L  --  57 44   ALK PHOS U/L  --  51 49*     Results from last 7 days   Lab Units 04/16/21  0455 04/15/21  0833 04/14/21  1451   WBC Thousand/uL 11 59* 17 12* 16 30*   HEMOGLOBIN g/dL 12 0 12 5 13 4   PLATELETS Thousands/uL 179 183 223     Results from last 7 days   Lab Units 04/14/21  1451   BLOOD CULTURE  No Growth at 24 hrs  No Growth at 24 hrs  Imaging Studies:   I have personally reviewed pertinent imaging study reports and images in PACS  EKG, Pathology, and Other Studies:   I have personally reviewed pertinent reports

## 2021-04-16 NOTE — UTILIZATION REVIEW
Reference # [id number]    The case was denied by [address]  for inpatient level care  The following information is sent for reconsideration of the denial     314/21:  68-year-old female with generalized symptoms but significantly elevated troponin, 45 53  She was admitted to level two step-down unit and heparin was initiated  She did have vague discomfort in her chest and reported very significant cardiac history  She met SIRS criteria  EKG showed nonspecific inferolateral T-wave changes  Her temperature increased to 103°  The patient was also loaded with Plavix with an order to then receive aspirin and Plavix daily  4/15/21:  Due to fever and leukocytosis, Infectious Disease evaluation was requested prior to cardiac catheterization  She had two episodes of supraventricular tachycardia vs  atrial fibrillation/flutter  At other times, she was noted to have some sinus tachycardia 106-107 beats per minute  Recommendation was to monitor closely off of antibiotics and check CT of chest abdomen and pelvis  This did not show any obvious focus of infection  She has underlying rheumatoid arthritis on baracitinib and methotrexate  4/16/21:  Patient underwent cardiac catheterization with 99% tubular proximal circumflex stenosis which was most likely the culprit lesion and treated with FRANCES  She also had proximal LAD 60%, proximal RCA 50% discrete in stent restenosis, RCA PDA 50% stenosis  The primary diagnosis is now acute NSTEMI    "Admitting picture appeared to be sepsis with fever, white count on elevation and tachypnea but cultures negative, may have been affects of myocardial infarction "  Echocardiogram also showed severe LV dysfunction with EF 35% (old echo showed 40-45%)  Today she is no longer febrile   She will remain in the hospital at least until tomorrow for further stabilization and management      Xenia Joy,         Vital Signs:       Date/Time  Temp  Pulse  Resp  BP  MAP  SpO2  O2 Device   04/16/21 1415  --  121  --  119/64  --  --  --   04/16/21 1300  --  101  --  110/55  --  --  --   04/16/21 1245  --  92  --  115/58  --  --  --   04/16/21 1215  --  95  --  114/57  --  --  --   04/16/21 1200  --  94  --  107/76  --  --  --   04/16/21 1145  --  --  --  109/66  83  93 %  --   04/16/21 1130  --  84  --  106/73  82  93 %  None (Room air)   04/16/21 0752  99 °F (37 2 °C)  73  18  120/71  --  93 %  None (Room air)   04/16/21 0315  98 6 °F (37 °C)  76  18  106/71  --  92 %  None (Room air)   04/16/21 0100  98 °F (36 7 °C)  101  18  145/80  --  95 %  None (Room air)   04/15/21 1934  98 9 °F (37 2 °C)  95  18  97/69  74  96 %  None (Room air)   04/15/21 1529  98 5 °F (36 9 °C)  107  16  112/73  --  94 %  None (Room air)   04/15/21 1129  97 6 °F (36 4 °C)  95  16  119/63  --  95 %  None (Room air)   04/15/21 0706  97 3 °F (36 3 °C)Abnormal   106  20  118/77  --  96 %  None (Room air)   04/14/21 2300  103 °F (39 4 °C)Abnormal   102  20  129/80  --  94 %  None (Room air)   04/14/21 2021  99 1 °F (37 3 °C)  96  21  107/70  88  96 %  None (Room air)             Pertinent Labs/Diagnostic Results:       4/16 Cardiac cath:     Left main: The vessel was large sized  Angiography showed mild atherosclerosis  Distal left main: There was a 25 % stenosis  LAD: The vessel was medium sized  Angiography showed moderate atherosclerosis  Proximal LAD: There was a discrete 60 % stenosis  It appears amenable to percutaneous intervention  Proximal circumflex: There was a tubular 99 % stenosis  The lesion was hazy, ulcerated, complex, moderately calcified, and associated with a large filling defect consistent with thrombus  There was WILLIAMS grade 2 flow through the vessel    (partial perfusion)  This lesion is a likely culprit for the patient's recent myocardial infarction  An intervention was performed  IVUS undersized stent   RCA: The vessel was medium to large sized  Angiography showed moderate atherosclerosis  Proximal RCA: There was a discrete 50 % stenosis at the site of a prior stent  Mid RCA: There was a 0 % stenosis at the site of a prior stent  Distal RCA: There was a 0 % stenosis at the site of a prior stent  Right PDA: There was a discrete 50 % stenosis      IMPRESSIONS:  There is significant triple vessel coronary artery disease        4/15 CT chest:    No acute cardiopulmonary abnormality  Focus of gas within the bladder, correlate for infectious versus an iatrogenic etiologies        Results from last 7 days   Lab Units 04/14/21  1457   SARS-COV-2  Negative     Results from last 7 days   Lab Units 04/16/21  0455 04/15/21  0833 04/14/21  1451   WBC Thousand/uL 11 59* 17 12* 16 30*   HEMOGLOBIN g/dL 12 0 12 5 13 4   HEMATOCRIT % 34 9 36 6 39 5*   PLATELETS Thousands/uL 179 183 223   NEUTROS ABS Thousands/µL 8 82* 13 46* 13 30*         Results from last 7 days   Lab Units 04/16/21  0455 04/15/21  0833 04/14/21  1451   SODIUM mmol/L 134* 135* 131*   POTASSIUM mmol/L 3 2* 3 3* 3 6   CHLORIDE mmol/L 100 101 98   CO2 mmol/L 23 23 22   ANION GAP mmol/L 11 11 11   BUN mg/dL 16 14 14   CREATININE mg/dL 0 80 0 88 0 84   EGFR ml/min/1 73sq m 71 64 67   CALCIUM mg/dL 8 6 8 5 9 4     Results from last 7 days   Lab Units 04/15/21  0833 04/14/21  1451   AST U/L 336* 266*   ALT U/L 57 44   ALK PHOS U/L 51 49*   TOTAL PROTEIN g/dL 6 6 7 1   ALBUMIN g/dL 2 9* 4 0   TOTAL BILIRUBIN mg/dL 1 32* 1 00         Results from last 7 days   Lab Units 04/16/21  0455 04/15/21  0833 04/14/21  1451   GLUCOSE RANDOM mg/dL 90 101 120*         Results from last 7 days   Lab Units 04/15/21  0833   HEMOGLOBIN A1C % 5 5   EAG mg/dl 111             Results from last 7 days   Lab Units 04/15/21  0042 04/14/21  2245 04/14/21  1748 04/14/21  1451   TROPONIN I ng/mL >40 00* >40 00* 41 39* 45 53*         Results from last 7 days   Lab Units 04/16/21  0131 04/15/21  1738 04/15/21  0833  04/14/21  1451   PROTIME seconds  --   --   --   --  13 9   INR --   --   --   --  1 08   PTT seconds 31 105* 47*   < > 28    < > = values in this interval not displayed  Results from last 7 days   Lab Units 04/15/21  0833 04/14/21  1451   PROCALCITONIN ng/ml 0 14 <0 05     Results from last 7 days   Lab Units 04/14/21  1451   LACTIC ACID mmol/L 1 1               Results from last 7 days   Lab Units 04/14/21  1615   CLARITY UA  Clear   COLOR UA  Straw   SPEC GRAV UA  <=1 005*   PH UA  7 5   GLUCOSE UA mg/dl Negative   KETONES UA mg/dl Negative   BLOOD UA  2+*   PROTEIN UA mg/dl Negative   NITRITE UA  Negative   BILIRUBIN UA  Negative   UROBILINOGEN UA E U /dl 0 2   LEUKOCYTES UA  Negative   WBC UA /hpf 0-1   RBC UA /hpf 2-4   BACTERIA UA /hpf Occasional   EPITHELIAL CELLS WET PREP /hpf Occasional     Results from last 7 days   Lab Units 04/14/21  1457   INFLUENZA A PCR  Negative   INFLUENZA B PCR  Negative   RSV PCR  Negative                             Results from last 7 days   Lab Units 04/14/21  1451   BLOOD CULTURE  No Growth at 24 hrs  No Growth at 24 hrs                   Medications:   Scheduled Medications:      aspirin, 81 mg, Oral, Daily  atorvastatin, 40 mg, Oral, Daily With Dinner  levothyroxine, 50 mcg, Oral, Early Morning  losartan, 100 mg, Oral, Daily  metoprolol succinate, 25 mg, Oral, Daily  nitroglycerin, 1 inch, Topical, Q6H  prasugrel, 10 mg, Oral, Daily      Continuous IV Infusions:     sodium chloride 0 9 % infusion   Rate: 150 mL/hr Dose: 150 mL/hr  Freq: Continuous Route: IV  Indications of Use: IV Hydration  Start: 04/16/21 1115     Infusion history:     heparin (porcine) 25,000 units in 0 45% NaCl 250 mL infusion (premix)   Rate: 2 4-16 mL/hr Dose: 3-20 Units/kg/hr  Weight Dosing Info: 80 kg (Order-Specific)  Freq: Titrated Route: IV  Start: 04/14/21 2230 End: 04/16/21 1146      PRN Meds:      acetaminophen, 650 mg, Oral, Q4H PRN  aluminum-magnesium hydroxide-simethicone, 30 mL, Oral, Q6H PRN  HYDROmorphone, 0 2 mg, Intravenous, Q4H PRN  nitroglycerin, 0 4 mg, Sublingual, Q5 Min PRN  ondansetron, 4 mg, Intravenous, Q6H PRN  oxyCODONE, 2 5 mg, Oral, Q4H PRN  zolpidem, 10 mg, Oral, HS PRN            Network Utilization Review Department  ATTENTION: Please call with any questions or concerns to [phone] and carefully listen to the prompts so that you are directed to the right person  All voicemails are confidential   Candia Siemens all requests for admission clinical reviews, approved or denied determinations and any other requests to dedicated fax number below belonging to the campus where the patient is receiving treatment   List of dedicated fax numbers for the Facilities:  [address] DENIALS (Administrative/Medical Necessity) [phone]   [address] (Maternity/NICU/Pediatrics) [phone]   [address] [place] [address] [address] [place] [address] Dr Baljit Thomas 1066 (  Cristina Tee "Delmy" 103) [address] [place] [address] Gayle Fitzpatrick 1481 [address] [place] [address] [phone]

## 2021-04-16 NOTE — PHYSICIAN ADVISOR
The case was denied by [address]  for inpatient level care  The following information will be sent for reconsideration of the denial   RF# [id number]      314/21:  47-year-old female with generalized symptoms but significantly elevated troponin, 45 53  She was admitted to level two step-down unit and heparin was initiated  She did have vague discomfort in her chest and reported very significant cardiac history  She met SIRS criteria  EKG showed nonspecific inferolateral T-wave changes  Her temperature increased to 103°  The patient was also loaded with Plavix with an order to then receive aspirin and Plavix daily  4/15/21:  Due to fever and leukocytosis, Infectious Disease evaluation was requested prior to cardiac catheterization  She had two episodes of supraventricular tachycardia vs  atrial fibrillation/flutter  At other times, she was noted to have some sinus tachycardia 106-107 beats per minute  Recommendation was to monitor closely off of antibiotics and check CT of chest abdomen and pelvis  This did not show any obvious focus of infection  She has underlying rheumatoid arthritis on baracitinib and methotrexate  4/16/21:  Patient underwent cardiac catheterization with 99% tubular proximal circumflex stenosis which was most likely the culprit lesion and treated with FRANCES  She also had proximal LAD 60%, proximal RCA 50% discrete in stent restenosis, RCA PDA 50% stenosis  The primary diagnosis is now acute NSTEMI    "Admitting picture appeared to be sepsis with fever, white count on elevation and tachypnea but cultures negative, may have been affects of myocardial infarction "  Echocardiogram also showed severe LV dysfunction with EF 35% (old echo showed 40-45%)  Today she is no longer febrile  She will remain in the hospital at least until tomorrow for further stabilization and management      [place] Mode, DO

## 2021-04-16 NOTE — PROGRESS NOTES
Progress Note - Cardiology   Sarah Scruggs 66 y o  female MRN: [id number]  Unit/Bed#: E4 -01 Encounter: [id number]    ADDENDUM:  Assessment:  1  New onset atrial fibrillation with RVR, rate approximately 130  2  No prior history of atrial fibrillation  3  Cardiac catheterization today by right femoral approach    Plan:  1  Will begin amiodarone orally to attempt to convert back to sinus rhythm  2  Would not discharge patient tomorrow if she is still in atrial fibrillation  3  Would not anticoagulate patient for 48-72 hours due to risk of right femoral artery bleeding and development of significant groin hematoma and pseudoaneurysm  4  Recommend beginning in 48-72 hours Eliquis 5 mg b i d       Interval history:  Patient developed atrial fibrillation with RVR  She has no known history of atrial fibrillation in the past   She is unaware of the rapid irregular heartbeat  She is hemodynamically stable but has a heart rate averaging 130-135  Vitals: /73 (BP Location: Right arm)   Pulse (!) 123   Temp 99 °F (37 2 °C) (Temporal)   Resp 18   Ht 5' 5" (1 651 m)   Wt 84 3 kg (185 lb 13 6 oz)   SpO2 95%   BMI 30 93 kg/m²   Vitals:    04/14/21 2021   Weight: 84 3 kg (185 lb 13 6 oz)     Orthostatic Blood Pressures      Most Recent Value   Blood Pressure  110/73 filed at 04/16/2021 1514   Patient Position - Orthostatic VS  Lying filed at 04/16/2021 1514            Intake/Output Summary (Last 24 hours) at 4/16/2021 1522  Last data filed at 4/16/2021 1019  Gross per 24 hour   Intake 1212 16 ml   Output 1100 ml   Net 112 16 ml       Invasive Devices     Peripheral Intravenous Line            Peripheral IV 04/16/21 Distal;Dorsal (posterior); Right Forearm less than 1 day          Drain            External Urinary Catheter 1 day                Physical Exam  Constitutional:       General: She is not in acute distress  Appearance: She is well-developed  HENT:      Head: Normocephalic and atraumatic  Neck:      Thyroid: No thyromegaly  Vascular: No JVD  Cardiovascular:      Rate and Rhythm: Normal rate  Rhythm irregular  Heart sounds: Normal heart sounds  No murmur  No friction rub  No gallop  Pulmonary:      Effort: No respiratory distress  Breath sounds: No wheezing, rhonchi or rales  Skin:     General: Skin is warm and dry  Neurological:      Mental Status: She is alert and oriented to person, place, and time  Psychiatric:         Behavior: Behavior normal        Imaging:   I have personally reviewed pertinent reports          EKG:  Atrial fibrillation with RVR, rate approximately 130

## 2021-04-17 NOTE — PROCEDURES
Central Line Insertion    Date/Time: 4/16/2021 8:48 PM  Performed by: LUCIA Cesar  Authorized by: LUCIA Cesar     Patient location:  ICU  Consent:     Consent obtained:  Emergent situation  Pre-procedure details:     Hand hygiene: Hand hygiene performed prior to insertion    Indications:     Central line indications: medications requiring central line    Anesthesia (see MAR for exact dosages): Anesthesia method:  None  Procedure details:     Location:  Right femoral    Vessel type: vein      Laterality:  Right    Approach: open technique used      Patient position:  Flat    Catheter type:  Triple lumen 20cm    Catheter size:  7 Fr    Landmarks identified: yes      Ultrasound guidance: no      Number of attempts:  1    Successful placement: yes    Post-procedure details:     Post-procedure:  Dressing applied    Assessment:  Blood return through all ports  Comments:      Placed during a code blue

## 2021-04-17 NOTE — PROCEDURES
Intubation    Date/Time: 4/16/2021 8:25 PM  Performed by: LUCIA Jules  Authorized by: LUCIA Jules     Patient location:  Bedside  Other Assisting Provider: No    Consent:     Consent obtained:  Emergent situation  Universal protocol:     Patient identity confirmed:  Hospital-assigned identification number and arm band  Pre-procedure details:     Patient status:  Unresponsive    Mallampati score:  2    Pretreatment medications:  None    Paralytics:  None  Indications:     Indications for intubation: hypoxemia    Procedure details:     Preoxygenation:  Bag valve mask    CPR in progress: yes      Intubation method:  Oral    Oral intubation technique:  Direct    Laryngoscope blade: Mac 3    Tube size (mm):  8 0    Tube type:  Cuffed    Number of attempts:  2    Ventilation between attempts: yes      Cricoid pressure: yes      Tube visualized through cords: yes    Placement assessment:     Tube secured with:  ETT rodriguez    Breath sounds:  Equal    Placement verification: chest rise, condensation, equal breath sounds and ETCO2 detector      CXR findings:  ETT in proper place  Post-procedure details:     Patient tolerance of procedure:   Tolerated well, no immediate complications

## 2021-04-17 NOTE — ASSESSMENT & PLAN NOTE
· Status post PTCA and stenting to unknown vessel  · Coronary artery disease  · COPD  · Rapid response  · Code blue  · Patient

## 2021-04-17 NOTE — DISCHARGE SUMMARY
[facility]  Discharge- Alicia [facility] 1945, 66 y o  female MRN: [id number]  Unit/Bed#: ICU 10 Encounter: [id number]  Primary Care Provider: LUCIA Summers   Date and time admitted to hospital: 2021  8:05 PM    COPD [facility]chronic obstructive pulmonary disease) ([facility] )  Assessment & Plan  · Patient intubated  · Hypoxia  · Patient     Coronary artery disease involving native coronary artery of native heart with angina pectoris ([facility] )  Assessment & Plan  · Status post PTCA and stenting of unknown vessel    * NSTEMI (non-ST elevated myocardial infarction) ([facility] )  Assessment & Plan  · Status post PTCA and stenting to unknown vessel  · Coronary artery disease  · COPD  · Rapid response  · Code blue  · Patient         Discharge Summary   Alicia [facility] 66 y o  female MRN: [id number]  Unit/Bed#: ICU 10 Encounter: [id number]    Admission Date: 2021     Discharge Date: 21    Admitting Diagnoses:   6  NSTEMI    Discharge Diagnoses:  1  Code blue      Procedures Performed:   Orders Placed This Encounter   Procedures    Cardiac catheterization   Intubation  Central line placement  ACLS    Hospital Course:   Patient was admitted on  for a NSTEMI  She was admitted to level to step-down and treated medically with  Patient underwent cardiac catheterization today with PTCA and stenting of unknown vessel  A rapid response was called this evening secondary to atrial fibrillation with a rapid ventricular response  The patient was taken to the intensive care unit where she was a code blue immediately  She was intubated and a central line was placed ACLS was performed for 23 minutes  Unfortunately the patient did not survive      Significant Findings, Care, Treatment and Services Provided:   Cardiac catheterization  PTCA and stent  Intubation  Central line   ACLS    Complications:   Death    Condition at Discharge:      Discharge instructions/Information to patient and family:   See after visit summary for information provided to patient and family  Provisions for Follow-Up Care:  See after visit summary for information related to follow-up care and any pertinent home health orders  Disposition:     Discharge Statement   I spent 58 minutes discharging the patient  This time was spent on the day of discharge  I had direct contact with the patient on the day of discharge  Additional documentation is required if more than 30 minutes were spent on discharge  Discharge Medications:  See after visit summary for reconciled discharge medications provided to patient and family

## 2021-04-17 NOTE — DEATH NOTE
INPATIENT DEATH NOTE  He Lorenzo 66 y o  female MRN: [id number]  Unit/Bed#: ICU 10 Encounter: [id number]    Date, Time and Cause of Death    Date of Death: 21  Time of Death:  7:13 PM  Preliminary Cause of Death: Cardiac arrest ([facility] )  Entered by: Ermias Hayes     Attribution     EG1 1 Merlin Ricker, CRNP 21 20:12               PHYSICAL EXAM:  Unresponsive to noxious stimuli, Spontaneous respirations absent, Breath sounds absent, Carotid pulse absent, Heart sounds absent, Pupillary light reflex absent and Corneal blink reflex absent    Medical Examiner notification criteria:   within 24 hours of surgery / procedure / anesthesia   Medical Examiner's office notified?:  Yes   Medical Examiner accepted case?:  No  Name of Medical Examiner:      Family Notification  Was the family notified?: Yes  Date Notified: 21  Time Notified:   Notified by: Merlin Ricker  Name of Family Notified of Death: Daughter   Relationship to Patient: Daughter  Family Notification Route: Telephone  Was the family told to contact a  home?: Yes  Name of [facility][de-identified] unknown    Autopsy Options:  Post-mortem examination declined by next of kin    Primary Service Attending Physician notified?:  yes - Attending:  Edis Liriano MD    Physician/Resident responsible for completing Discharge Summary:  Merlin Ricker

## 2021-04-19 LAB
BACTERIA BLD CULT: NORMAL
BACTERIA BLD CULT: NORMAL